# Patient Record
Sex: MALE | Race: WHITE | Employment: FULL TIME | URBAN - METROPOLITAN AREA
[De-identification: names, ages, dates, MRNs, and addresses within clinical notes are randomized per-mention and may not be internally consistent; named-entity substitution may affect disease eponyms.]

---

## 2018-12-07 ENCOUNTER — OFFICE VISIT (OUTPATIENT)
Dept: CARDIOLOGY CLINIC | Age: 53
End: 2018-12-07
Payer: COMMERCIAL

## 2018-12-07 VITALS
RESPIRATION RATE: 16 BRPM | BODY MASS INDEX: 35.21 KG/M2 | HEART RATE: 60 BPM | HEIGHT: 72 IN | WEIGHT: 260 LBS | DIASTOLIC BLOOD PRESSURE: 82 MMHG | SYSTOLIC BLOOD PRESSURE: 120 MMHG

## 2018-12-07 DIAGNOSIS — I25.10 ATHEROSCLEROSIS OF NATIVE CORONARY ARTERY OF NATIVE HEART WITHOUT ANGINA PECTORIS: Primary | ICD-10-CM

## 2018-12-07 PROCEDURE — G8417 CALC BMI ABV UP PARAM F/U: HCPCS | Performed by: INTERNAL MEDICINE

## 2018-12-07 PROCEDURE — G8427 DOCREV CUR MEDS BY ELIG CLIN: HCPCS | Performed by: INTERNAL MEDICINE

## 2018-12-07 PROCEDURE — 93000 ELECTROCARDIOGRAM COMPLETE: CPT | Performed by: INTERNAL MEDICINE

## 2018-12-07 PROCEDURE — 3017F COLORECTAL CA SCREEN DOC REV: CPT | Performed by: INTERNAL MEDICINE

## 2018-12-07 PROCEDURE — G8484 FLU IMMUNIZE NO ADMIN: HCPCS | Performed by: INTERNAL MEDICINE

## 2018-12-07 PROCEDURE — 1036F TOBACCO NON-USER: CPT | Performed by: INTERNAL MEDICINE

## 2018-12-07 PROCEDURE — 99214 OFFICE O/P EST MOD 30 MIN: CPT | Performed by: INTERNAL MEDICINE

## 2018-12-07 PROCEDURE — G8598 ASA/ANTIPLAT THER USED: HCPCS | Performed by: INTERNAL MEDICINE

## 2018-12-07 NOTE — PROGRESS NOTES
** Merged History Encounter **            Family History   Problem Relation Age of Onset    Heart Attack Father     Heart Disease Father     Heart Disease Brother        SUBJECTIVE: Hal Zuluaga presents to the office today for re-evaluation of 3 chronic cardiac diagnoses. He complains of no concerning symptoms and denies dyspnea, exertional chest pressure/discomfort, orthopnea, palpitations, paroxysmal nocturnal dyspnea and syncope. He works full time as a , and does all chores around the home without restriction. Certainly no syncope. Review of Systems  Other than stated in HPI, negative 10-point system review. Objective:   Physical Exam   Constitutional: He is oriented to person, place, and time. He appears well-developed. He is cooperative. HENT: oral mucosa well hydrated, normal teeth and gums  Head: Normocephalic and atraumatic. Eyes: Conjunctivae are normal. Pupils are equal, round  Neck: No JVD present. Carotid bruit is not present. Cardiovascular: Regular rhythm, S1 normal, S2 normal and intact distal pulses. PMI is not displaced. Exam reveals no gallop. No murmur heard. Pulmonary/Chest: Effort normal and breath sounds normal. No respiratory distress. Sternotomy scar     Abdominal: Soft. Normal appearance and bowel sounds are normal. He exhibits no abdominal bruit and no pulsatile midline mass. There is no hepatomegaly. No tenderness. Musculoskeletal: Normal range of motion. He exhibits no edema. Neurological: He is alert and oriented to person, place, and time. Gait normal.   Skin: Skin is warm and dry. No bruising, no ecchymosis and no rash noted. He is not diaphoretic. No cyanosis. Psychiatric: He has a normal mood and affect. His behavior is normal. Thought content normal.     EKG:  sinus bradycardia,  nonspecific ST and T waves changes, anterolateral and inferior MI patterns, no change  .         ASSESSMENT & PLAN:    Patient Active Problem List

## 2019-06-07 DIAGNOSIS — I71.21 ASCENDING AORTIC ANEURYSM: Primary | ICD-10-CM

## 2019-06-12 DIAGNOSIS — I71.21 ASCENDING AORTIC ANEURYSM: Primary | ICD-10-CM

## 2019-08-07 ENCOUNTER — HOSPITAL ENCOUNTER (OUTPATIENT)
Dept: CT IMAGING | Age: 54
Discharge: HOME | End: 2019-08-09
Payer: COMMERCIAL

## 2019-08-07 DIAGNOSIS — I71.21 ASCENDING AORTIC ANEURYSM: ICD-10-CM

## 2019-08-07 PROCEDURE — 71250 CT THORAX DX C-: CPT

## 2019-08-27 ENCOUNTER — OFFICE VISIT (OUTPATIENT)
Dept: CARDIOTHORACIC SURGERY | Age: 54
End: 2019-08-27
Payer: COMMERCIAL

## 2019-08-27 VITALS
HEIGHT: 72 IN | DIASTOLIC BLOOD PRESSURE: 78 MMHG | HEART RATE: 59 BPM | BODY MASS INDEX: 32.51 KG/M2 | WEIGHT: 240 LBS | SYSTOLIC BLOOD PRESSURE: 123 MMHG

## 2019-08-27 DIAGNOSIS — I71.21 THORACIC ASCENDING AORTIC ANEURYSM: Primary | ICD-10-CM

## 2019-08-27 PROCEDURE — 3017F COLORECTAL CA SCREEN DOC REV: CPT | Performed by: THORACIC SURGERY (CARDIOTHORACIC VASCULAR SURGERY)

## 2019-08-27 PROCEDURE — 99214 OFFICE O/P EST MOD 30 MIN: CPT | Performed by: THORACIC SURGERY (CARDIOTHORACIC VASCULAR SURGERY)

## 2019-08-27 PROCEDURE — 1036F TOBACCO NON-USER: CPT | Performed by: THORACIC SURGERY (CARDIOTHORACIC VASCULAR SURGERY)

## 2019-08-27 PROCEDURE — G8417 CALC BMI ABV UP PARAM F/U: HCPCS | Performed by: THORACIC SURGERY (CARDIOTHORACIC VASCULAR SURGERY)

## 2019-08-27 PROCEDURE — G8427 DOCREV CUR MEDS BY ELIG CLIN: HCPCS | Performed by: THORACIC SURGERY (CARDIOTHORACIC VASCULAR SURGERY)

## 2019-08-27 PROCEDURE — G8598 ASA/ANTIPLAT THER USED: HCPCS | Performed by: THORACIC SURGERY (CARDIOTHORACIC VASCULAR SURGERY)

## 2019-12-19 ENCOUNTER — TELEPHONE (OUTPATIENT)
Dept: CARDIOLOGY CLINIC | Age: 54
End: 2019-12-19

## 2020-01-07 ENCOUNTER — OFFICE VISIT (OUTPATIENT)
Dept: CARDIOLOGY CLINIC | Age: 55
End: 2020-01-07
Payer: COMMERCIAL

## 2020-01-07 VITALS
RESPIRATION RATE: 16 BRPM | BODY MASS INDEX: 34.99 KG/M2 | HEIGHT: 73 IN | HEART RATE: 55 BPM | WEIGHT: 264 LBS | DIASTOLIC BLOOD PRESSURE: 84 MMHG | SYSTOLIC BLOOD PRESSURE: 126 MMHG

## 2020-01-07 PROCEDURE — 93000 ELECTROCARDIOGRAM COMPLETE: CPT | Performed by: INTERNAL MEDICINE

## 2020-01-07 PROCEDURE — G8427 DOCREV CUR MEDS BY ELIG CLIN: HCPCS | Performed by: INTERNAL MEDICINE

## 2020-01-07 PROCEDURE — 3017F COLORECTAL CA SCREEN DOC REV: CPT | Performed by: INTERNAL MEDICINE

## 2020-01-07 PROCEDURE — 1036F TOBACCO NON-USER: CPT | Performed by: INTERNAL MEDICINE

## 2020-01-07 PROCEDURE — 99214 OFFICE O/P EST MOD 30 MIN: CPT | Performed by: INTERNAL MEDICINE

## 2020-01-07 PROCEDURE — G8484 FLU IMMUNIZE NO ADMIN: HCPCS | Performed by: INTERNAL MEDICINE

## 2020-01-07 PROCEDURE — G8417 CALC BMI ABV UP PARAM F/U: HCPCS | Performed by: INTERNAL MEDICINE

## 2020-01-07 NOTE — PROGRESS NOTES
Medical: Not on file     Non-medical: Not on file   Tobacco Use    Smoking status: Never Smoker    Smokeless tobacco: Never Used   Substance and Sexual Activity    Alcohol use: Yes     Comment: social    Drug use: No    Sexual activity: Not on file   Lifestyle    Physical activity:     Days per week: Not on file     Minutes per session: Not on file    Stress: Not on file   Relationships    Social connections:     Talks on phone: Not on file     Gets together: Not on file     Attends Shinto service: Not on file     Active member of club or organization: Not on file     Attends meetings of clubs or organizations: Not on file     Relationship status: Not on file    Intimate partner violence:     Fear of current or ex partner: Not on file     Emotionally abused: Not on file     Physically abused: Not on file     Forced sexual activity: Not on file   Other Topics Concern    Not on file   Social History Narrative    ** Merged History Encounter **            Family History   Problem Relation Age of Onset    Heart Attack Father     Heart Disease Father     Heart Disease Brother        SUBJECTIVE: Regina Yadav presents to the office today for re-evaluation of 3 chronic cardiac diagnoses. He complains of no concerning symptoms and denies dyspnea, exertional chest pressure/discomfort, orthopnea, palpitations, paroxysmal nocturnal dyspnea and syncope. He works full time as a , and does all chores around the home without restriction. Certainly no syncope. Released by CTS for stable thoracic aneurysm over 10 years. Review of Systems  Other than stated in HPI, negative 10-point system review. Objective:   Physical Exam   Constitutional: He is oriented to person, place, and time. He appears well-developed. He is cooperative. HENT: oral mucosa well hydrated, normal teeth and gums  Head: Normocephalic and atraumatic.    Eyes: Conjunctivae are normal. Pupils are equal, round  Neck: No JVD present. Carotid bruit is not present. Cardiovascular: Regular rhythm, S1 normal, S2 normal and intact distal pulses. PMI is not displaced. Exam reveals no gallop. No murmur heard. Pulmonary/Chest: Effort normal and breath sounds normal. No respiratory distress. Sternotomy scar     Abdominal: Soft. Normal appearance and bowel sounds are normal. He exhibits no abdominal bruit and no pulsatile midline mass. There is no hepatomegaly. No tenderness. Musculoskeletal: Normal range of motion. He exhibits no edema. Neurological: He is alert and oriented to person, place, and time. Gait normal.   Skin: Skin is warm and dry. No bruising, no ecchymosis and no rash noted. He is not diaphoretic. No cyanosis. Psychiatric: He has a normal mood and affect. His behavior is normal. Thought content normal.     EKG:  sinus bradycardia,  nonspecific ST and T waves changes, anterolateral and inferior MI patterns, no change  . ASSESSMENT & PLAN:    Patient Active Problem List   Diagnoses    Coronary atherosclerosis of native coronary artery: s/p emergency ACB after presenting 2009 with anterior MI. No symptoms of angina    LV dysfunction: EF improved post revascularization and vasoactive meds.  Hyperlipidemia: target LDL < 70: On statin     Hypertension: at target   *  * Aortic insufficiency: mild by echo 2011  Obesity: extensive discussion about weight loss desirability and strategies         The patient was educated as to the symptoms that would require a prompt return to our office. Short of that, he will be seen at his regularly scheduled visit in 1 year.

## 2021-03-12 ENCOUNTER — IMMUNIZATION (OUTPATIENT)
Dept: PRIMARY CARE CLINIC | Age: 56
End: 2021-03-12
Payer: COMMERCIAL

## 2021-03-12 PROCEDURE — 91300 COVID-19, PFIZER VACCINE 30MCG/0.3ML DOSE: CPT | Performed by: NURSE PRACTITIONER

## 2021-03-12 PROCEDURE — 0001A COVID-19, PFIZER VACCINE 30MCG/0.3ML DOSE: CPT | Performed by: NURSE PRACTITIONER

## 2021-04-13 ENCOUNTER — IMMUNIZATION (OUTPATIENT)
Dept: PRIMARY CARE CLINIC | Age: 56
End: 2021-04-13
Payer: COMMERCIAL

## 2021-04-13 PROCEDURE — 91300 COVID-19, PFIZER VACCINE 30MCG/0.3ML DOSE: CPT | Performed by: INTERNAL MEDICINE

## 2021-04-13 PROCEDURE — 0002A COVID-19, PFIZER VACCINE 30MCG/0.3ML DOSE: CPT | Performed by: INTERNAL MEDICINE

## 2023-01-04 ENCOUNTER — OFFICE VISIT (OUTPATIENT)
Dept: CARDIOLOGY CLINIC | Age: 58
End: 2023-01-04
Payer: COMMERCIAL

## 2023-01-04 VITALS
WEIGHT: 259 LBS | HEART RATE: 59 BPM | HEIGHT: 72 IN | BODY MASS INDEX: 35.08 KG/M2 | DIASTOLIC BLOOD PRESSURE: 72 MMHG | SYSTOLIC BLOOD PRESSURE: 125 MMHG | RESPIRATION RATE: 16 BRPM

## 2023-01-04 DIAGNOSIS — I50.20 HFREF (HEART FAILURE WITH REDUCED EJECTION FRACTION) (HCC): ICD-10-CM

## 2023-01-04 DIAGNOSIS — I77.810 THORACIC AORTIC ECTASIA (HCC): ICD-10-CM

## 2023-01-04 DIAGNOSIS — I25.10 ATHEROSCLEROSIS OF NATIVE CORONARY ARTERY OF NATIVE HEART WITHOUT ANGINA PECTORIS: Primary | ICD-10-CM

## 2023-01-04 PROCEDURE — G8484 FLU IMMUNIZE NO ADMIN: HCPCS | Performed by: INTERNAL MEDICINE

## 2023-01-04 PROCEDURE — G8417 CALC BMI ABV UP PARAM F/U: HCPCS | Performed by: INTERNAL MEDICINE

## 2023-01-04 PROCEDURE — 99214 OFFICE O/P EST MOD 30 MIN: CPT | Performed by: INTERNAL MEDICINE

## 2023-01-04 PROCEDURE — G8427 DOCREV CUR MEDS BY ELIG CLIN: HCPCS | Performed by: INTERNAL MEDICINE

## 2023-01-04 PROCEDURE — 3078F DIAST BP <80 MM HG: CPT | Performed by: INTERNAL MEDICINE

## 2023-01-04 PROCEDURE — 93000 ELECTROCARDIOGRAM COMPLETE: CPT | Performed by: INTERNAL MEDICINE

## 2023-01-04 PROCEDURE — 3017F COLORECTAL CA SCREEN DOC REV: CPT | Performed by: INTERNAL MEDICINE

## 2023-01-04 PROCEDURE — 3074F SYST BP LT 130 MM HG: CPT | Performed by: INTERNAL MEDICINE

## 2023-01-04 PROCEDURE — 1036F TOBACCO NON-USER: CPT | Performed by: INTERNAL MEDICINE

## 2023-01-04 NOTE — PROGRESS NOTES
Subjective:      Patient ID: Jesús Gordon is a 59 y.o. male. Chief Complaint   Patient presents with    Coronary Artery Disease    Cardiomyopathy       Patient Active Problem List    Diagnosis Date Noted    Thoracic aortic ectasia ([facility] 01/04/2023     Overview Note:     CT scan report disputed by dr Elsy Cooper who felt max diameter was 4.0 and recommended no need for ongoing annual scans , as stable dimension noted for 10 yrs                          Hypertension 11/14/2011    Coronary atherosclerosis of native coronary artery 11/09/2011     Overview Note: A. S/P anterior myocardial infarction 4/15/09 treated with PTCA of culprit lesion    and immediate aortic coronary bypass grafting by Dr. Litzy Disla. LIMA - LAD            SVG - DIAG 1            SVG - DIAG 2            SVG - OM 1            SVG - PDA      HFrEF (heart failure with reduced ejection fraction) ([facility] 11/09/2011     Overview Note:       A. MUGA scan 10/27/09. LVEF 40%. B. Echo 2/09/2011 [facility]): LVEF: 40-45%  C. Echo 12/28/2017:  EF 40-45%        Hyperlipidemia 11/09/2011       Current Outpatient Medications   Medication Sig Dispense Refill    atorvastatin (LIPITOR) 80 MG tablet Take 80 mg by mouth daily      enalapril (VASOTEC) 10 MG tablet Take 10 mg by mouth daily      omeprazole (PRILOSEC) 20 MG capsule Take 20 mg by mouth Daily       metoprolol (LOPRESSOR) 25 MG tablet Take 12.5 mg by mouth 2 times daily       aspirin 81 MG EC tablet Take 81 mg by mouth daily.        Current Facility-Administered Medications   Medication Dose Route Frequency Provider Last Rate Last Admin    perflutren lipid microspheres (DEFINITY) injection 1.5 mL  1.5 mL IntraVENous ONCE PRN Anselmo Kumar MD            Allergies   Allergen Reactions    Crestor  [Rosuvastatin Calcium]        Vitals:    01/04/23 0813   BP: 125/72   Pulse: 59   Resp: 16   Weight: 259 lb (117.5 kg)   Height: 6' (1.829 m)             SUBJECTIVE: Jesús Gordon presents to the office today for re-evaluation of  chronic cardiac diagnoses. He complains of  no concerning symptoms  and denies dyspnea, exertional chest pressure/discomfort, orthopnea, palpitations, paroxysmal nocturnal dyspnea and syncope. He works full time as a , and does all chores around the home without restriction. Released by CTS for stable thoracic aneurysm over 10 years. Objective:   Physical Exam   Constitutional: He is oriented to person, place, and time. He appears well-developed. He is cooperative. Neck: No JVD present. Carotid bruit is not present. Cardiovascular: Regular rhythm, S1 normal, S2 normal and intact distal pulses. PMI is not displaced. Exam reveals no gallop. No murmur heard. Pulmonary/Chest: Effort normal and breath sounds normal. No respiratory distress. Sternotomy scar  Abdominal: Soft. Normal appearance and bowel sounds are normal. He exhibits no abdominal bruit  Musculoskeletal: Normal range of motion. He exhibits no edema. Neurological: He is alert and oriented to person, place, and time. Gait normal.   Skin: Skin is warm and dry. No bruising  Psychiatric: He has a normal mood and affect. His behavior is normal. Thought content normal.     EKG:  sinus bradycardia,  nonspecific ST and T waves changes, anterolateral and inferior MI patterns, no change  . ASSESSMENT & PLAN:    Patient Active Problem List   Diagnoses    Coronary atherosclerosis of native coronary artery: s/p emergency ACB after presenting 2009 with anterior MI. No symptoms of angina    LV dysfunction:     Hyperlipidemia: target LDL < 70:   On high intensity statin     Hypertension: at target   *  * Aortic insufficiency: mild by echo 2011  Obesity:      Will echo as it has been 5 yrs since last assessment of EF - if fallen will change ACE to ARNI and metoprolol preparation to succinate, as well as consider MRA/SGLT2i therapy

## 2023-01-23 ENCOUNTER — TELEPHONE (OUTPATIENT)
Dept: CARDIOLOGY | Age: 58
End: 2023-01-23

## 2023-01-24 ENCOUNTER — TELEPHONE (OUTPATIENT)
Dept: CARDIOLOGY CLINIC | Age: 58
End: 2023-01-24

## 2023-01-24 ENCOUNTER — HOSPITAL ENCOUNTER (OUTPATIENT)
Dept: CARDIOLOGY | Age: 58
Discharge: HOME | End: 2023-01-24
Payer: COMMERCIAL

## 2023-01-24 DIAGNOSIS — I50.20 HFREF (HEART FAILURE WITH REDUCED EJECTION FRACTION) (HCC): ICD-10-CM

## 2023-01-24 LAB
LV EF: 50 %
LVEF MODALITY: NORMAL

## 2023-01-24 PROCEDURE — 93306 TTE W/DOPPLER COMPLETE: CPT

## 2023-01-24 PROCEDURE — 6360000004 HC RX CONTRAST MEDICATION: Performed by: INTERNAL MEDICINE

## 2023-01-24 PROCEDURE — 2580000003 HC RX 258: Performed by: INTERNAL MEDICINE

## 2023-01-24 RX ORDER — SODIUM CHLORIDE 0.9 % (FLUSH) 0.9 %
10 SYRINGE (ML) INJECTION PRN
Status: DISCONTINUED | OUTPATIENT
Start: 2023-01-24 | End: 2023-01-25 | Stop reason: HOSPADM

## 2023-01-24 RX ADMIN — SODIUM CHLORIDE, PRESERVATIVE FREE 10 ML: 5 INJECTION INTRAVENOUS at 08:31

## 2023-01-24 RX ADMIN — PERFLUTREN 1 ML: 6.52 INJECTION, SUSPENSION INTRAVENOUS at 08:31

## 2023-01-24 NOTE — TELEPHONE ENCOUNTER
----- Message from El Barnes MD sent at 1/24/2023  9:52 AM EST -----  Heart still as strong as before  Stay on same meds  Ov one year      ----- Message -----  From: Isidro Appiah Incoming Cardiology Results From [facility]  Sent: 1/24/2023   9:51 AM EST  To: El Barnes MD

## 2023-08-03 ENCOUNTER — OFFICE VISIT (OUTPATIENT)
Dept: CARDIOLOGY CLINIC | Age: 58
End: 2023-08-03
Payer: COMMERCIAL

## 2023-08-03 VITALS
DIASTOLIC BLOOD PRESSURE: 84 MMHG | RESPIRATION RATE: 18 BRPM | HEIGHT: 73 IN | BODY MASS INDEX: 33 KG/M2 | HEART RATE: 71 BPM | WEIGHT: 249 LBS | SYSTOLIC BLOOD PRESSURE: 126 MMHG

## 2023-08-03 DIAGNOSIS — I50.20 HFREF (HEART FAILURE WITH REDUCED EJECTION FRACTION) (HCC): ICD-10-CM

## 2023-08-03 DIAGNOSIS — I25.10 ATHEROSCLEROSIS OF NATIVE CORONARY ARTERY OF NATIVE HEART WITHOUT ANGINA PECTORIS: Primary | ICD-10-CM

## 2023-08-03 PROCEDURE — G8417 CALC BMI ABV UP PARAM F/U: HCPCS | Performed by: INTERNAL MEDICINE

## 2023-08-03 PROCEDURE — G8427 DOCREV CUR MEDS BY ELIG CLIN: HCPCS | Performed by: INTERNAL MEDICINE

## 2023-08-03 PROCEDURE — 3017F COLORECTAL CA SCREEN DOC REV: CPT | Performed by: INTERNAL MEDICINE

## 2023-08-03 PROCEDURE — 93000 ELECTROCARDIOGRAM COMPLETE: CPT | Performed by: INTERNAL MEDICINE

## 2023-08-03 PROCEDURE — 1036F TOBACCO NON-USER: CPT | Performed by: INTERNAL MEDICINE

## 2023-08-03 PROCEDURE — 99215 OFFICE O/P EST HI 40 MIN: CPT | Performed by: INTERNAL MEDICINE

## 2023-08-03 PROCEDURE — 3074F SYST BP LT 130 MM HG: CPT | Performed by: INTERNAL MEDICINE

## 2023-08-03 PROCEDURE — 3079F DIAST BP 80-89 MM HG: CPT | Performed by: INTERNAL MEDICINE

## 2023-08-03 RX ORDER — EZETIMIBE 10 MG/1
10 TABLET ORAL DAILY
Qty: 30 TABLET | Refills: 3 | Status: SHIPPED | OUTPATIENT
Start: 2023-08-03

## 2023-08-03 NOTE — PROGRESS NOTES
Subjective:      Patient ID: Mattie Smiley is a 59 y.o. male. Chief Complaint   Patient presents with    Coronary Artery Disease    Cardiomyopathy       Patient Active Problem List    Diagnosis Date Noted    Thoracic aortic ectasia ([address]) 01/04/2023     Overview Note:     CT scan report disputed by dr Srinivas Petersen who felt max diameter was 4.0 and recommended no need for ongoing annual scans , as stable dimension noted for 10 yrs                          Hypertension 11/14/2011    Coronary atherosclerosis of native coronary artery 11/09/2011     Overview Note: A. S/P anterior myocardial infarction 4/15/09 treated with PTCA of culprit lesion    and immediate aortic coronary bypass grafting by Dr. Kem Richards. RYAN - LAD            SVG - DIAG 1            SVG - DIAG 2            SVG - OM 1            SVG - PDA      HFrEF (heart failure with reduced ejection fraction) ([address]) 11/09/2011     Overview Note:       A. MUGA scan 10/27/09. LVEF 40%. B. Echo 2/09/2011 [facility]): LVEF: 40-45%  C. Echo 12/28/2017:  EF 40-45%  D. Echo 1/24/2023: EF 50%        Hyperlipidemia 11/09/2011       Current Outpatient Medications   Medication Sig Dispense Refill    atorvastatin (LIPITOR) 80 MG tablet Take 1 tablet by mouth daily      enalapril (VASOTEC) 10 MG tablet Take 1 tablet by mouth daily      omeprazole (PRILOSEC) 20 MG capsule Take 1 capsule by mouth Daily      metoprolol (LOPRESSOR) 25 MG tablet Take 0.5 tablets by mouth 2 times daily Indications: 1/2 pill bid      aspirin 81 MG EC tablet Take 1 tablet by mouth daily       No current facility-administered medications for this visit. Allergies   Allergen Reactions    Crestor  [Rosuvastatin Calcium]        Vitals:    08/03/23 0755   BP: 126/84   Pulse: 71   Resp: 18   Weight: 249 lb (112.9 kg)   Height: 6' 1\" (1.854 m)             SUBJECTIVE: Mattie Smiley presents to the office today for urgent re-evaluation of  chronic cardiac diagnoses.   About 6 weeks

## 2023-08-10 ENCOUNTER — TELEPHONE (OUTPATIENT)
Dept: CARDIOLOGY | Age: 58
End: 2023-08-10

## 2023-08-10 NOTE — TELEPHONE ENCOUNTER
Spoke with patient and confirmed nuclear exercise stress test appointment on August 14, 2023 at 0700. Instructions for test, and COVID-19 preprocedure information reviewed with patient, questions answered. Patient verbalized understanding. Also instructed to call office if unable to keep appointment.

## 2023-08-14 ENCOUNTER — HOSPITAL ENCOUNTER (OUTPATIENT)
Dept: CARDIOLOGY | Age: 58
Discharge: HOME | End: 2023-08-14
Payer: COMMERCIAL

## 2023-08-14 ENCOUNTER — TELEPHONE (OUTPATIENT)
Dept: CARDIOLOGY CLINIC | Age: 58
End: 2023-08-14

## 2023-08-14 VITALS
HEIGHT: 73 IN | WEIGHT: 247 LBS | RESPIRATION RATE: 12 BRPM | DIASTOLIC BLOOD PRESSURE: 74 MMHG | HEART RATE: 64 BPM | BODY MASS INDEX: 32.74 KG/M2 | SYSTOLIC BLOOD PRESSURE: 120 MMHG

## 2023-08-14 DIAGNOSIS — Z01.810 PREOPERATIVE CARDIOVASCULAR EXAMINATION: Primary | ICD-10-CM

## 2023-08-14 DIAGNOSIS — I50.20 HFREF (HEART FAILURE WITH REDUCED EJECTION FRACTION) (HCC): ICD-10-CM

## 2023-08-14 DIAGNOSIS — I25.10 ATHEROSCLEROSIS OF NATIVE CORONARY ARTERY OF NATIVE HEART WITHOUT ANGINA PECTORIS: ICD-10-CM

## 2023-08-14 DIAGNOSIS — R94.39 ABNORMAL STRESS TEST: ICD-10-CM

## 2023-08-14 LAB
LV EF: 36 %
LVEF MODALITY: NORMAL

## 2023-08-14 PROCEDURE — A9500 TC99M SESTAMIBI: HCPCS | Performed by: INTERNAL MEDICINE

## 2023-08-14 PROCEDURE — 3430000000 HC RX DIAGNOSTIC RADIOPHARMACEUTICAL: Performed by: INTERNAL MEDICINE

## 2023-08-14 PROCEDURE — 2580000003 HC RX 258: Performed by: INTERNAL MEDICINE

## 2023-08-14 PROCEDURE — 78452 HT MUSCLE IMAGE SPECT MULT: CPT

## 2023-08-14 PROCEDURE — 93017 CV STRESS TEST TRACING ONLY: CPT

## 2023-08-14 RX ORDER — IBUPROFEN 800 MG/1
TABLET ORAL PRN
COMMUNITY
Start: 2023-07-11

## 2023-08-14 RX ORDER — SODIUM CHLORIDE 0.9 % (FLUSH) 0.9 %
10 SYRINGE (ML) INJECTION PRN
Status: DISCONTINUED | OUTPATIENT
Start: 2023-08-14 | End: 2023-08-15 | Stop reason: HOSPADM

## 2023-08-14 RX ORDER — TETRAKIS(2-METHOXYISOBUTYLISOCYANIDE)COPPER(I) TETRAFLUOROBORATE 1 MG/ML
31.9 INJECTION, POWDER, LYOPHILIZED, FOR SOLUTION INTRAVENOUS
Status: COMPLETED | OUTPATIENT
Start: 2023-08-14 | End: 2023-08-14

## 2023-08-14 RX ORDER — TETRAKIS(2-METHOXYISOBUTYLISOCYANIDE)COPPER(I) TETRAFLUOROBORATE 1 MG/ML
10.4 INJECTION, POWDER, LYOPHILIZED, FOR SOLUTION INTRAVENOUS
Status: COMPLETED | OUTPATIENT
Start: 2023-08-14 | End: 2023-08-14

## 2023-08-14 RX ORDER — HYDROCODONE BITARTRATE AND ACETAMINOPHEN 7.5; 325 MG/1; MG/1
TABLET ORAL PRN
COMMUNITY
Start: 2023-07-11

## 2023-08-14 RX ADMIN — SODIUM CHLORIDE, PRESERVATIVE FREE 10 ML: 5 INJECTION INTRAVENOUS at 08:31

## 2023-08-14 RX ADMIN — Medication 10.4 MILLICURIE: at 07:09

## 2023-08-14 RX ADMIN — SODIUM CHLORIDE, PRESERVATIVE FREE 10 ML: 5 INJECTION INTRAVENOUS at 07:10

## 2023-08-14 RX ADMIN — Medication 31.9 MILLICURIE: at 08:31

## 2023-08-14 NOTE — TELEPHONE ENCOUNTER
And his wife notified and instructed on stress test results and recommendation for cath and medication changes. They stated understanding.

## 2023-08-14 NOTE — PROCEDURES
[address] [facility] Vascular [address]   [facility], [address]  [phone]                Exercise Stress Nuclear Gated SPECT Study    Name: Elizabeth Jung Account Number: [de-identified]    :  1965      Sex: male              Date of Study:  2023    Height: 6' 1\" (185.4 cm)  Weight - Scale: 247 lb (112 kg)     Ordering Provider: Janiya Akins MD          PCP: Joanie Griffin DO      Cardiologist: Janiya Akins MD                        Interpreting Physician: Jaylene Arellano MD  _________________________________________________________________________________    Indication:   Evaluation of extent and severity of coronary artery disease    Clinical History:   Patient has prior history of coronary artery disease. Resting ECG:    HR 48 bpm  Sinus bradycardia, old AWMI pattern    Exercise: The patient exercised using a Parvez protocol, completing 4:50 minutes and reaching an estimated work load of 7.86 metabolic equivalents (METS). Resting HR was 48. Peak exercise heart rate was 151 ( 93% of maximum predicted heart rate for age). Baseline /74. Peak exercise /80. The blood pressure response to exercise was normal      Exercise was terminated due to dyspnea. The patient experienced no chest pain with exercise, but developed mid-sternal chest tightness post-exercise lasting 3.5 minutes into recovery. Pulse oximetry was used to monitor oxygen saturation during the stress test.  The study was performed on Room Air. The resting pulse oximeter was 96%. The lowest O2 saturation seen during exercise was 98 %. The average O2 saturation with exercise was 98 %. Exercise ECG:   The patient demonstrated occasional PVC's during exercise. With exercise, there were no ST segment changes of significance at the heart rate achieved.     IMAGING: Myocardial perfusion imaging was performed at rest 30-35 minutes following

## 2023-08-14 NOTE — DISCHARGE INSTRUCTIONS
[address] [facility] and Vascular Lab      Instructions to Patients    The following are the instructions for patients who have had a procedure in our office today. Patient name: Latoya Gaona    Radionuclide Activity: 40mCi of 99mTc-Sestamibi    Date Administered: 8/14/2023    Expires: 48 hours after scheduled appointment time      Patient may resume normal activity unless otherwise instructed. Patient may resume medications as normal.  If the need should arise, patient may call [phone] between the hours of 8:00am-5:00pm.  After hours there is at least one physician on-call at all times for those patients needing assistance. Patients may call [phone] and the answering service will direct the patient to one of our physicians for assistance. After the patient's test if they are going to be leaving from an airport in the near future they should take this letter with them to verify the test and radionuclide used for their test.      This letter verifies that the above named bearer received an injection of a radionuclide for medical purpose/usage only.         Electronically signed by Adonay Allen on 8/14/2023 at 8:21 AM

## 2023-08-14 NOTE — TELEPHONE ENCOUNTER
----- Message from Kiana Paulino MD sent at 8/14/2023  1:55 PM EDT -----  Let him know stress abnormal and needs a cath  - numbers 9/3  Stop enlapril (vasotec)  Wait 48 hours and start entresto 24/26 bid  Make sure he is on ASA    Thx      ----- Message -----  From: Oriana Calderon MD  Sent: 8/14/2023   1:43 PM EDT  To: Kiana Paulino MD

## 2023-08-16 ENCOUNTER — TELEPHONE (OUTPATIENT)
Dept: CARDIOLOGY CLINIC | Age: 58
End: 2023-08-16

## 2023-08-16 NOTE — TELEPHONE ENCOUNTER
Prior auth    Heart cath [id number]    Dx: abnormal stress test R94.39         Chest pain R07.2    RH ordered     8/23/23 @ 1:30

## 2023-08-21 ENCOUNTER — HOSPITAL ENCOUNTER (OUTPATIENT)
Age: 58
Discharge: HOME | End: 2023-08-21
Payer: COMMERCIAL

## 2023-08-21 DIAGNOSIS — R94.39 ABNORMAL STRESS TEST: ICD-10-CM

## 2023-08-21 DIAGNOSIS — I25.10 ATHEROSCLEROSIS OF NATIVE CORONARY ARTERY OF NATIVE HEART WITHOUT ANGINA PECTORIS: ICD-10-CM

## 2023-08-21 DIAGNOSIS — I50.20 HFREF (HEART FAILURE WITH REDUCED EJECTION FRACTION) (HCC): ICD-10-CM

## 2023-08-21 DIAGNOSIS — Z01.810 PREOPERATIVE CARDIOVASCULAR EXAMINATION: ICD-10-CM

## 2023-08-21 LAB
ALBUMIN SERPL-MCNC: 4.1 G/DL (ref 3.5–5.2)
ALP SERPL-CCNC: 82 U/L (ref 40–129)
ALT SERPL-CCNC: 23 U/L (ref 0–40)
ANION GAP SERPL CALCULATED.3IONS-SCNC: 7 MMOL/L (ref 7–16)
AST SERPL-CCNC: 31 U/L (ref 0–39)
BILIRUB SERPL-MCNC: 0.9 MG/DL (ref 0–1.2)
BUN SERPL-MCNC: 11 MG/DL (ref 6–20)
CALCIUM SERPL-MCNC: 9.2 MG/DL (ref 8.6–10.2)
CHLORIDE SERPL-SCNC: 106 MMOL/L (ref 98–107)
CO2 SERPL-SCNC: 27 MMOL/L (ref 22–29)
CREAT SERPL-MCNC: 1 MG/DL (ref 0.7–1.2)
ERYTHROCYTE [DISTWIDTH] IN BLOOD BY AUTOMATED COUNT: 12.9 % (ref 11.5–15)
GFR SERPL CREATININE-BSD FRML MDRD: >60 ML/MIN/1.73M2
GLUCOSE SERPL-MCNC: 120 MG/DL (ref 74–99)
HCT VFR BLD AUTO: 44.3 % (ref 37–54)
HGB BLD-MCNC: 14.7 G/DL (ref 12.5–16.5)
INR PPP: 1
MCH RBC QN AUTO: 30.2 PG (ref 26–35)
MCHC RBC AUTO-ENTMCNC: 33.2 G/DL (ref 32–34.5)
MCV RBC AUTO: 91 FL (ref 80–99.9)
PLATELET # BLD AUTO: 255 K/UL (ref 130–450)
PMV BLD AUTO: 9.4 FL (ref 7–12)
POTASSIUM SERPL-SCNC: 4.7 MMOL/L (ref 3.5–5)
PROT SERPL-MCNC: 6.9 G/DL (ref 6.4–8.3)
PROTHROMBIN TIME: 11.3 SEC (ref 9.3–12.4)
RBC # BLD AUTO: 4.87 M/UL (ref 3.8–5.8)
SODIUM SERPL-SCNC: 140 MMOL/L (ref 132–146)
WBC OTHER # BLD: 6.5 K/UL (ref 4.5–11.5)

## 2023-08-21 PROCEDURE — 85610 PROTHROMBIN TIME: CPT

## 2023-08-21 PROCEDURE — 36415 COLL VENOUS BLD VENIPUNCTURE: CPT

## 2023-08-21 PROCEDURE — 85027 COMPLETE CBC AUTOMATED: CPT

## 2023-08-21 PROCEDURE — 80053 COMPREHEN METABOLIC PANEL: CPT

## 2023-08-22 ENCOUNTER — TELEPHONE (OUTPATIENT)
Dept: CARDIAC CATH/INVASIVE PROCEDURES | Age: 58
End: 2023-08-22

## 2023-08-23 ENCOUNTER — HOSPITAL ENCOUNTER (INPATIENT)
Dept: CARDIAC CATH/INVASIVE PROCEDURES | Age: 58
LOS: 5 days | Discharge: HOME | DRG: 247 | End: 2023-08-28
Attending: INTERNAL MEDICINE | Admitting: INTERNAL MEDICINE
Payer: COMMERCIAL

## 2023-08-23 PROBLEM — I25.10 CAD IN NATIVE ARTERY: Status: ACTIVE | Noted: 2023-08-23

## 2023-08-23 LAB
ABO + RH BLD: NORMAL
ACTIVATED CLOTTING TIME, LOW RANGE: 164 SEC
ACTIVATED CLOTTING TIME, LOW RANGE: 271 SEC
ARM BAND NUMBER: NORMAL
BLOOD BANK SAMPLE EXPIRATION: NORMAL
BLOOD GROUP ANTIBODIES SERPL: NEGATIVE

## 2023-08-23 PROCEDURE — 93459 L HRT ART/GRFT ANGIO: CPT

## 2023-08-23 PROCEDURE — 6370000000 HC RX 637 (ALT 250 FOR IP): Performed by: INTERNAL MEDICINE

## 2023-08-23 PROCEDURE — C1887 CATHETER, GUIDING: HCPCS

## 2023-08-23 PROCEDURE — B2111ZZ FLUOROSCOPY OF MULTIPLE CORONARY ARTERIES USING LOW OSMOLAR CONTRAST: ICD-10-PCS | Performed by: INTERNAL MEDICINE

## 2023-08-23 PROCEDURE — C1725 CATH, TRANSLUMIN NON-LASER: HCPCS

## 2023-08-23 PROCEDURE — 2580000003 HC RX 258: Performed by: INTERNAL MEDICINE

## 2023-08-23 PROCEDURE — 92928 PRQ TCAT PLMT NTRAC ST 1 LES: CPT | Performed by: INTERNAL MEDICINE

## 2023-08-23 PROCEDURE — 86900 BLOOD TYPING SEROLOGIC ABO: CPT

## 2023-08-23 PROCEDURE — 2500000003 HC RX 250 WO HCPCS

## 2023-08-23 PROCEDURE — 92920 PRQ TRLUML C ANGIOP 1ART&/BR: CPT

## 2023-08-23 PROCEDURE — 86901 BLOOD TYPING SEROLOGIC RH(D): CPT

## 2023-08-23 PROCEDURE — B2131ZZ FLUOROSCOPY OF MULTIPLE CORONARY ARTERY BYPASS GRAFTS USING LOW OSMOLAR CONTRAST: ICD-10-PCS | Performed by: INTERNAL MEDICINE

## 2023-08-23 PROCEDURE — 4A023N7 MEASUREMENT OF CARDIAC SAMPLING AND PRESSURE, LEFT HEART, PERCUTANEOUS APPROACH: ICD-10-PCS | Performed by: INTERNAL MEDICINE

## 2023-08-23 PROCEDURE — 86850 RBC ANTIBODY SCREEN: CPT

## 2023-08-23 PROCEDURE — 6370000000 HC RX 637 (ALT 250 FOR IP)

## 2023-08-23 PROCEDURE — 2140000000 HC CCU INTERMEDIATE R&B

## 2023-08-23 PROCEDURE — 93459 L HRT ART/GRFT ANGIO: CPT | Performed by: INTERNAL MEDICINE

## 2023-08-23 PROCEDURE — C1894 INTRO/SHEATH, NON-LASER: HCPCS

## 2023-08-23 PROCEDURE — 2709999900 HC NON-CHARGEABLE SUPPLY

## 2023-08-23 PROCEDURE — 85347 COAGULATION TIME ACTIVATED: CPT

## 2023-08-23 PROCEDURE — C1769 GUIDE WIRE: HCPCS

## 2023-08-23 PROCEDURE — 6360000002 HC RX W HCPCS

## 2023-08-23 RX ORDER — ASPIRIN 325 MG
325 TABLET ORAL DAILY
Status: DISCONTINUED | OUTPATIENT
Start: 2023-08-23 | End: 2023-08-23 | Stop reason: ALTCHOICE

## 2023-08-23 RX ORDER — ASPIRIN 81 MG/1
81 TABLET ORAL DAILY
Status: DISCONTINUED | OUTPATIENT
Start: 2023-08-24 | End: 2023-08-28 | Stop reason: HOSPADM

## 2023-08-23 RX ORDER — METOPROLOL SUCCINATE 25 MG/1
25 TABLET, EXTENDED RELEASE ORAL DAILY
Status: DISCONTINUED | OUTPATIENT
Start: 2023-08-23 | End: 2023-08-26

## 2023-08-23 RX ORDER — ACETAMINOPHEN 325 MG/1
650 TABLET ORAL EVERY 4 HOURS PRN
Status: DISCONTINUED | OUTPATIENT
Start: 2023-08-23 | End: 2023-08-25 | Stop reason: SDUPTHER

## 2023-08-23 RX ORDER — CLOPIDOGREL BISULFATE 75 MG/1
75 TABLET ORAL DAILY
Status: DISCONTINUED | OUTPATIENT
Start: 2023-08-24 | End: 2023-08-24

## 2023-08-23 RX ORDER — ATORVASTATIN CALCIUM 40 MG/1
40 TABLET, FILM COATED ORAL NIGHTLY
Status: DISCONTINUED | OUTPATIENT
Start: 2023-08-23 | End: 2023-08-28 | Stop reason: HOSPADM

## 2023-08-23 RX ORDER — SODIUM CHLORIDE 0.9 % (FLUSH) 0.9 %
5-40 SYRINGE (ML) INJECTION PRN
Status: DISCONTINUED | OUTPATIENT
Start: 2023-08-23 | End: 2023-08-28 | Stop reason: HOSPADM

## 2023-08-23 RX ORDER — SODIUM CHLORIDE 9 MG/ML
INJECTION, SOLUTION INTRAVENOUS ONCE
Status: COMPLETED | OUTPATIENT
Start: 2023-08-23 | End: 2023-08-23

## 2023-08-23 RX ORDER — SODIUM CHLORIDE 0.9 % (FLUSH) 0.9 %
5-40 SYRINGE (ML) INJECTION EVERY 12 HOURS SCHEDULED
Status: DISCONTINUED | OUTPATIENT
Start: 2023-08-23 | End: 2023-08-28 | Stop reason: HOSPADM

## 2023-08-23 RX ORDER — SODIUM CHLORIDE 9 MG/ML
INJECTION, SOLUTION INTRAVENOUS PRN
Status: DISCONTINUED | OUTPATIENT
Start: 2023-08-23 | End: 2023-08-28 | Stop reason: HOSPADM

## 2023-08-23 RX ADMIN — ACETAMINOPHEN 650 MG: 325 TABLET ORAL at 16:52

## 2023-08-23 RX ADMIN — ACETAMINOPHEN 650 MG: 325 TABLET ORAL at 20:59

## 2023-08-23 RX ADMIN — ASPIRIN 325 MG: 325 TABLET ORAL at 12:10

## 2023-08-23 RX ADMIN — ATORVASTATIN CALCIUM 40 MG: 40 TABLET, FILM COATED ORAL at 20:59

## 2023-08-23 RX ADMIN — SODIUM CHLORIDE: 9 INJECTION, SOLUTION INTRAVENOUS at 12:14

## 2023-08-23 RX ADMIN — SODIUM CHLORIDE, PRESERVATIVE FREE 10 ML: 5 INJECTION INTRAVENOUS at 20:59

## 2023-08-23 RX ADMIN — SODIUM CHLORIDE: 9 INJECTION, SOLUTION INTRAVENOUS at 12:11

## 2023-08-23 RX ADMIN — METOPROLOL SUCCINATE 25 MG: 25 TABLET, EXTENDED RELEASE ORAL at 16:14

## 2023-08-23 ASSESSMENT — PAIN DESCRIPTION - ORIENTATION: ORIENTATION: LOWER;MID

## 2023-08-23 ASSESSMENT — PAIN SCALES - GENERAL
PAINLEVEL_OUTOF10: 0
PAINLEVEL_OUTOF10: 3
PAINLEVEL_OUTOF10: 6
PAINLEVEL_OUTOF10: 0

## 2023-08-23 ASSESSMENT — PAIN DESCRIPTION - DESCRIPTORS
DESCRIPTORS: ACHING
DESCRIPTORS: ACHING

## 2023-08-23 ASSESSMENT — PAIN DESCRIPTION - LOCATION
LOCATION: BACK
LOCATION: BACK

## 2023-08-23 ASSESSMENT — PAIN - FUNCTIONAL ASSESSMENT: PAIN_FUNCTIONAL_ASSESSMENT: ACTIVITIES ARE NOT PREVENTED

## 2023-08-23 ASSESSMENT — PAIN DESCRIPTION - FREQUENCY: FREQUENCY: INTERMITTENT

## 2023-08-23 ASSESSMENT — PAIN DESCRIPTION - ONSET: ONSET: GRADUAL

## 2023-08-23 ASSESSMENT — PAIN DESCRIPTION - PAIN TYPE: TYPE: ACUTE PAIN

## 2023-08-23 NOTE — PROCEDURES
[address], [address] [place] [address]                            CARDIAC CATHETERIZATION    PATIENT NAME: Dave Duncan                 :        1965  MED REC NO:   [id number]                            ROOM:       6326  ACCOUNT NO:   [de-identified]                           ADMIT DATE: 2023  PROVIDER:     Puja Joya MD    DATE OF PROCEDURE:  2023    LEFT HEART CATHETERIZATION WITH FAILED PCI OF THE CIRCUMFLEX ARTERY    REFERRING PHYSICIAN:  Paul Toussaint MD    INDICATION:  Dyspnea on exertion and positive exercise nuclear stress test.    PROCEDURE NOTE:  After obtaining a signed consent from the patient, he  was brought to the cardiac cath lab in usual fasting state. Under  sterile condition and under local anesthetic and using conscious  sedation, a 5-Mexican sheath was inserted into his right common femoral  artery using a micropuncture needle under ultrasound guidance. Then, a  5-Mexican JL-4 diagnostic catheter was advanced over a J-tip wire to the  ascending aorta without difficulty. This catheter failed to engage the  left main coronary artery. It was exchanged over a guidewire to a  5-Mexican JL-5 diagnostic catheter which was able to engage the left main  coronary artery and angiogram was done. This catheter was exchanged  over a guidewire to a 5-Mexican JR-4 diagnostic catheter which was able  to engage the right coronary and angiogram was done. The same catheter  was used to engage the SVG attached to a diagonal branch and an  angiogram was done. This catheter was also used to engage an SVG  attached to the RCA and angiogram was done. This catheter failed to  engage the other two vein grafts. This catheter was then advanced into  the left subclavian artery and the LIMA was advanced over an exchange  wire to subclavian artery. The LIMA was engaged and angiogram was done.   This catheter was exchanged

## 2023-08-24 PROBLEM — I25.119 CORONARY ARTERY DISEASE INVOLVING NATIVE CORONARY ARTERY OF NATIVE HEART WITH ANGINA PECTORIS (HCC): Status: ACTIVE | Noted: 2023-08-23

## 2023-08-24 PROBLEM — Z95.1 STATUS POST CORONARY ARTERY BYPASS GRAFT: Status: ACTIVE | Noted: 2023-08-24

## 2023-08-24 LAB
ANION GAP SERPL CALCULATED.3IONS-SCNC: 10 MMOL/L (ref 7–16)
BUN SERPL-MCNC: 9 MG/DL (ref 6–20)
CALCIUM SERPL-MCNC: 8.7 MG/DL (ref 8.6–10.2)
CHLORIDE SERPL-SCNC: 105 MMOL/L (ref 98–107)
CO2 SERPL-SCNC: 23 MMOL/L (ref 22–29)
CREAT SERPL-MCNC: 0.9 MG/DL (ref 0.7–1.2)
ERYTHROCYTE [DISTWIDTH] IN BLOOD BY AUTOMATED COUNT: 12.9 % (ref 11.5–15)
GFR SERPL CREATININE-BSD FRML MDRD: >60 ML/MIN/1.73M2
GLUCOSE SERPL-MCNC: 104 MG/DL (ref 74–99)
HCT VFR BLD AUTO: 42.7 % (ref 37–54)
HGB BLD-MCNC: 14.3 G/DL (ref 12.5–16.5)
MAGNESIUM SERPL-MCNC: 2 MG/DL (ref 1.6–2.6)
MCH RBC QN AUTO: 30.5 PG (ref 26–35)
MCHC RBC AUTO-ENTMCNC: 33.5 G/DL (ref 32–34.5)
MCV RBC AUTO: 91 FL (ref 80–99.9)
PLATELET # BLD AUTO: 229 K/UL (ref 130–450)
PMV BLD AUTO: 9.7 FL (ref 7–12)
POTASSIUM SERPL-SCNC: 3.9 MMOL/L (ref 3.5–5)
RBC # BLD AUTO: 4.69 M/UL (ref 3.8–5.8)
SODIUM SERPL-SCNC: 138 MMOL/L (ref 132–146)
WBC OTHER # BLD: 7.4 K/UL (ref 4.5–11.5)

## 2023-08-24 PROCEDURE — 6370000000 HC RX 637 (ALT 250 FOR IP): Performed by: INTERNAL MEDICINE

## 2023-08-24 PROCEDURE — 2140000000 HC CCU INTERMEDIATE R&B

## 2023-08-24 PROCEDURE — 85027 COMPLETE CBC AUTOMATED: CPT

## 2023-08-24 PROCEDURE — 2580000003 HC RX 258: Performed by: INTERNAL MEDICINE

## 2023-08-24 PROCEDURE — 83735 ASSAY OF MAGNESIUM: CPT

## 2023-08-24 PROCEDURE — 99232 SBSQ HOSP IP/OBS MODERATE 35: CPT | Performed by: INTERNAL MEDICINE

## 2023-08-24 PROCEDURE — 36415 COLL VENOUS BLD VENIPUNCTURE: CPT

## 2023-08-24 PROCEDURE — 80048 BASIC METABOLIC PNL TOTAL CA: CPT

## 2023-08-24 RX ORDER — CLOPIDOGREL BISULFATE 75 MG/1
75 TABLET ORAL DAILY
Status: DISCONTINUED | OUTPATIENT
Start: 2023-08-25 | End: 2023-08-28 | Stop reason: HOSPADM

## 2023-08-24 RX ORDER — CLOPIDOGREL 300 MG/1
300 TABLET, FILM COATED ORAL ONCE
Status: DISCONTINUED | OUTPATIENT
Start: 2023-08-24 | End: 2023-08-24

## 2023-08-24 RX ORDER — CLOPIDOGREL BISULFATE 75 MG/1
225 TABLET ORAL ONCE
Status: COMPLETED | OUTPATIENT
Start: 2023-08-24 | End: 2023-08-24

## 2023-08-24 RX ADMIN — ASPIRIN 81 MG: 81 TABLET, COATED ORAL at 08:29

## 2023-08-24 RX ADMIN — CLOPIDOGREL BISULFATE 225 MG: 75 TABLET ORAL at 09:47

## 2023-08-24 RX ADMIN — SODIUM CHLORIDE, PRESERVATIVE FREE 10 ML: 5 INJECTION INTRAVENOUS at 08:30

## 2023-08-24 RX ADMIN — SODIUM CHLORIDE, PRESERVATIVE FREE 10 ML: 5 INJECTION INTRAVENOUS at 20:06

## 2023-08-24 RX ADMIN — ATORVASTATIN CALCIUM 40 MG: 40 TABLET, FILM COATED ORAL at 20:06

## 2023-08-24 RX ADMIN — CLOPIDOGREL BISULFATE 75 MG: 75 TABLET ORAL at 08:29

## 2023-08-24 RX ADMIN — METOPROLOL SUCCINATE 25 MG: 25 TABLET, EXTENDED RELEASE ORAL at 08:29

## 2023-08-24 ASSESSMENT — PAIN SCALES - GENERAL
PAINLEVEL_OUTOF10: 0
PAINLEVEL_OUTOF10: 0

## 2023-08-24 NOTE — CARE COORDINATION
8/24, Patient had a heart cath 8/23/23. Patient was not available to speak with. SW will follow for any home going needs.       Levi Shepherd, [facility] Case Management  [phone]

## 2023-08-24 NOTE — H&P
[facility]  History and Physical      CHIEF COMPLAINT:    No chief complaint on file. Patient of [facility],  presents with:  <principal problem not specified>    History of Present Illness:   Patient is a 47-year-old male with a past medical history of AAA, CAD, HLD, HTN, CABG x5 in 2009 who presents to the hospital for scheduled cardiac catheterization secondary to a abnormal stress test outpatient. Patient is status post cardiac catheterization which revealed severe triple-vessel coronary artery disease with multiple occlusions. Patient is admitted to intermediate telemetry and for further work-up and evaluation with cardiology. On evaluation he is resting comfortably in no acute distress. He is awaiting input from cardiology regarding possible atherectomy which is to be planned either today or tomorrow. Family is at bedside. Patient is asymptomatic      REVIEW OF SYSTEMS:  Pertinent negatives are above in HPI. 10 point ROS otherwise negative. Past Medical History:   Diagnosis Date    Ascending aortic aneurysm (HCC)     CAD (coronary artery disease)     Cardiac ischemia     Chronic back pain     Hyperlipidemia     Hypertension     MI (myocardial infarction) ([address])     S/P coronary artery bypass graft x 5 04/15/09    DR. SUAREZ         Past Surgical History:   Procedure Laterality Date    APPENDECTOMY      CARDIAC CATHETERIZATION  08/23/2023    Dr. Maguire Guess GRAFT  04/15/2009    EMERGENT ACB X 5/ DR. SUAREZ    CORONARY ARTERY BYPASS GRAFT      DIAGNOSTIC CARDIAC CATH LAB PROCEDURE  04/15/2009    Braxton Singleton M.D. FOOT SURGERY      UPPER GASTROINTESTINAL ENDOSCOPY  05/24/2013    removal foreign body    VASECTOMY         Medications Prior to Admission:    Medications Prior to Admission: HYDROcodone-acetaminophen ([address]) 7.5-325 MG per tablet, as needed.   ibuprofen (ADVIL;MOTRIN) 800 MG tablet, as needed  sacubitril-valsartan (ENTRESTO) 24-26 MG per

## 2023-08-25 ENCOUNTER — APPOINTMENT (OUTPATIENT)
Dept: CARDIAC CATH/INVASIVE PROCEDURES | Age: 58
DRG: 247 | End: 2023-08-25
Attending: INTERNAL MEDICINE
Payer: COMMERCIAL

## 2023-08-25 LAB
ACTIVATED CLOTTING TIME, LOW RANGE: 297 SEC
ACTIVATED CLOTTING TIME, LOW RANGE: 379 SEC
ANION GAP SERPL CALCULATED.3IONS-SCNC: 9 MMOL/L (ref 7–16)
BASOPHILS # BLD: 0.07 K/UL (ref 0–0.2)
BASOPHILS NFR BLD: 1 % (ref 0–2)
BUN SERPL-MCNC: 9 MG/DL (ref 6–20)
CALCIUM SERPL-MCNC: 8.9 MG/DL (ref 8.6–10.2)
CHLORIDE SERPL-SCNC: 103 MMOL/L (ref 98–107)
CO2 SERPL-SCNC: 24 MMOL/L (ref 22–29)
CREAT SERPL-MCNC: 1 MG/DL (ref 0.7–1.2)
EOSINOPHIL # BLD: 0.21 K/UL (ref 0.05–0.5)
EOSINOPHILS RELATIVE PERCENT: 3 % (ref 0–6)
ERYTHROCYTE [DISTWIDTH] IN BLOOD BY AUTOMATED COUNT: 13.1 % (ref 11.5–15)
GFR SERPL CREATININE-BSD FRML MDRD: >60 ML/MIN/1.73M2
GLUCOSE SERPL-MCNC: 107 MG/DL (ref 74–99)
HCT VFR BLD AUTO: 43.6 % (ref 37–54)
HGB BLD-MCNC: 14.5 G/DL (ref 12.5–16.5)
IMM GRANULOCYTES # BLD AUTO: <0.03 K/UL (ref 0–0.58)
IMM GRANULOCYTES NFR BLD: 0 % (ref 0–5)
LYMPHOCYTES NFR BLD: 1.64 K/UL (ref 1.5–4)
LYMPHOCYTES RELATIVE PERCENT: 25 % (ref 20–42)
MCH RBC QN AUTO: 30.2 PG (ref 26–35)
MCHC RBC AUTO-ENTMCNC: 33.3 G/DL (ref 32–34.5)
MCV RBC AUTO: 90.8 FL (ref 80–99.9)
MONOCYTES NFR BLD: 0.49 K/UL (ref 0.1–0.95)
MONOCYTES NFR BLD: 7 % (ref 2–12)
NEUTROPHILS NFR BLD: 63 % (ref 43–80)
NEUTS SEG NFR BLD: 4.19 K/UL (ref 1.8–7.3)
PLATELET # BLD AUTO: 237 K/UL (ref 130–450)
PMV BLD AUTO: 9.7 FL (ref 7–12)
POTASSIUM SERPL-SCNC: 3.7 MMOL/L (ref 3.5–5)
RBC # BLD AUTO: 4.8 M/UL (ref 3.8–5.8)
SODIUM SERPL-SCNC: 136 MMOL/L (ref 132–146)
WBC OTHER # BLD: 6.6 K/UL (ref 4.5–11.5)

## 2023-08-25 PROCEDURE — 6370000000 HC RX 637 (ALT 250 FOR IP): Performed by: INTERNAL MEDICINE

## 2023-08-25 PROCEDURE — 2140000000 HC CCU INTERMEDIATE R&B

## 2023-08-25 PROCEDURE — 2709999900 HC NON-CHARGEABLE SUPPLY

## 2023-08-25 PROCEDURE — 027034Z DILATION OF CORONARY ARTERY, ONE ARTERY WITH DRUG-ELUTING INTRALUMINAL DEVICE, PERCUTANEOUS APPROACH: ICD-10-PCS | Performed by: INTERNAL MEDICINE

## 2023-08-25 PROCEDURE — C1887 CATHETER, GUIDING: HCPCS

## 2023-08-25 PROCEDURE — C1874 STENT, COATED/COV W/DEL SYS: HCPCS

## 2023-08-25 PROCEDURE — 80048 BASIC METABOLIC PNL TOTAL CA: CPT

## 2023-08-25 PROCEDURE — 2580000003 HC RX 258: Performed by: INTERNAL MEDICINE

## 2023-08-25 PROCEDURE — C1769 GUIDE WIRE: HCPCS

## 2023-08-25 PROCEDURE — C1760 CLOSURE DEV, VASC: HCPCS

## 2023-08-25 PROCEDURE — B41F1ZZ FLUOROSCOPY OF RIGHT LOWER EXTREMITY ARTERIES USING LOW OSMOLAR CONTRAST: ICD-10-PCS | Performed by: INTERNAL MEDICINE

## 2023-08-25 PROCEDURE — 2500000003 HC RX 250 WO HCPCS

## 2023-08-25 PROCEDURE — C1725 CATH, TRANSLUMIN NON-LASER: HCPCS

## 2023-08-25 PROCEDURE — 36415 COLL VENOUS BLD VENIPUNCTURE: CPT

## 2023-08-25 PROCEDURE — 93005 ELECTROCARDIOGRAM TRACING: CPT | Performed by: INTERNAL MEDICINE

## 2023-08-25 PROCEDURE — 92928 PRQ TCAT PLMT NTRAC ST 1 LES: CPT

## 2023-08-25 PROCEDURE — 85347 COAGULATION TIME ACTIVATED: CPT

## 2023-08-25 PROCEDURE — C1894 INTRO/SHEATH, NON-LASER: HCPCS

## 2023-08-25 PROCEDURE — 85025 COMPLETE CBC W/AUTO DIFF WBC: CPT

## 2023-08-25 PROCEDURE — 6360000002 HC RX W HCPCS

## 2023-08-25 RX ORDER — ACETAMINOPHEN 325 MG/1
650 TABLET ORAL EVERY 4 HOURS PRN
Status: DISCONTINUED | OUTPATIENT
Start: 2023-08-25 | End: 2023-08-28 | Stop reason: HOSPADM

## 2023-08-25 RX ORDER — SODIUM CHLORIDE 9 MG/ML
INJECTION, SOLUTION INTRAVENOUS CONTINUOUS
Status: DISCONTINUED | OUTPATIENT
Start: 2023-08-25 | End: 2023-08-28 | Stop reason: HOSPADM

## 2023-08-25 RX ADMIN — ACETAMINOPHEN 650 MG: 325 TABLET ORAL at 19:45

## 2023-08-25 RX ADMIN — CLOPIDOGREL 75 MG: 75 TABLET, FILM COATED ORAL at 07:38

## 2023-08-25 RX ADMIN — METOPROLOL SUCCINATE 25 MG: 25 TABLET, EXTENDED RELEASE ORAL at 14:09

## 2023-08-25 RX ADMIN — SODIUM CHLORIDE, PRESERVATIVE FREE 10 ML: 5 INJECTION INTRAVENOUS at 14:15

## 2023-08-25 RX ADMIN — ACETAMINOPHEN 650 MG: 325 TABLET ORAL at 14:14

## 2023-08-25 RX ADMIN — SODIUM CHLORIDE: 9 INJECTION, SOLUTION INTRAVENOUS at 19:17

## 2023-08-25 RX ADMIN — ATORVASTATIN CALCIUM 40 MG: 40 TABLET, FILM COATED ORAL at 21:25

## 2023-08-25 RX ADMIN — SODIUM CHLORIDE, PRESERVATIVE FREE 10 ML: 5 INJECTION INTRAVENOUS at 21:25

## 2023-08-25 RX ADMIN — ASPIRIN 81 MG: 81 TABLET, COATED ORAL at 07:38

## 2023-08-25 ASSESSMENT — PAIN SCALES - GENERAL
PAINLEVEL_OUTOF10: 5
PAINLEVEL_OUTOF10: 2
PAINLEVEL_OUTOF10: 0
PAINLEVEL_OUTOF10: 0
PAINLEVEL_OUTOF10: 4

## 2023-08-25 ASSESSMENT — PAIN - FUNCTIONAL ASSESSMENT
PAIN_FUNCTIONAL_ASSESSMENT: PREVENTS OR INTERFERES SOME ACTIVE ACTIVITIES AND ADLS
PAIN_FUNCTIONAL_ASSESSMENT: ACTIVITIES ARE NOT PREVENTED

## 2023-08-25 ASSESSMENT — PAIN DESCRIPTION - DESCRIPTORS
DESCRIPTORS: ACHING
DESCRIPTORS: ACHING;DISCOMFORT

## 2023-08-25 ASSESSMENT — PAIN DESCRIPTION - ORIENTATION
ORIENTATION: MID;POSTERIOR
ORIENTATION: MID;LEFT

## 2023-08-25 ASSESSMENT — PAIN DESCRIPTION - LOCATION
LOCATION: BACK
LOCATION: BACK

## 2023-08-25 NOTE — PROCEDURES
[address], [address] [place] [address]                            CARDIAC CATHETERIZATION    PATIENT NAME: Blanca Lin                 :        1965  MED REC NO:   [id number]                            ROOM:       6326  ACCOUNT NO:   [de-identified]                           ADMIT DATE: 2023  PROVIDER:     Leland Hernandez MD    DATE OF PROCEDURE:  2023    SURGEON:  Leland Hernandez MD    PROCEDURES PERFORMED:  1. Percutaneous coronary intervention with a deployment of 2.5 x 18 mm  Lincoln Marland drug-eluting stent in proximal left circumflex artery. 2.  Deployment of ProGlide ProStyle suture closure device in the right  common femoral artery. 3.  Conscious sedation using Versed and fentanyl. Indication #16, score of 8. INDICATION FOR PROCEDURE:  The patient is a 25-year-old male with  abnormal stress test who had cardiac catheterization the other day. The  patient did receive first stage PCI to left circumflex artery. DESCRIPTION OF PROCEDURE:  After the appropriate informed consent, the  right groin area was prepped and draped in the usual sterile fashion. A  timeout was completed. The right groin area was locally anesthetized  with 2 mL of 2% lidocaine. A Cook needle was used to access the right  femoral artery. A 7-Finnish sheath was used to cannulate the right  femoral artery. A 7-Finnish EBU 4 guiding catheter was used to engage  the vessel. A BMW wire was used to cross the lesion. A 6-Finnish  Telescope extension catheter was used for extra support. The lesion was  predilated multiple times using 1.5 balloon, 2.0, 2.5, then 2.0  noncompliant, then 2.5 noncompliant, then the lesion was stented using a  2.5 x 18 mm Lincoln Marland drug-eluting stent. The final angiography  showed no dissection or perforation. At that time, the catheter and the  wire were pulled out from the body.   Limited right

## 2023-08-25 NOTE — PLAN OF CARE
Problem: Discharge Planning  Goal: Discharge to home or other facility with appropriate resources  8/25/2023 1133 by Eunice Wesley RN  Outcome: Progressing  Flowsheets (Taken 8/25/2023 1109)  Discharge to home or other facility with appropriate resources: Identify barriers to discharge with patient and caregiver  8/24/2023 9401 by Kali Baez, RN  Outcome: Progressing

## 2023-08-26 LAB
ANION GAP SERPL CALCULATED.3IONS-SCNC: 10 MMOL/L (ref 7–16)
BUN SERPL-MCNC: 9 MG/DL (ref 6–20)
CALCIUM SERPL-MCNC: 8.6 MG/DL (ref 8.6–10.2)
CHLORIDE SERPL-SCNC: 100 MMOL/L (ref 98–107)
CO2 SERPL-SCNC: 24 MMOL/L (ref 22–29)
CREAT SERPL-MCNC: 0.9 MG/DL (ref 0.7–1.2)
ERYTHROCYTE [DISTWIDTH] IN BLOOD BY AUTOMATED COUNT: 12.9 % (ref 11.5–15)
GFR SERPL CREATININE-BSD FRML MDRD: >60 ML/MIN/1.73M2
GLUCOSE SERPL-MCNC: 105 MG/DL (ref 74–99)
HCT VFR BLD AUTO: 40.6 % (ref 37–54)
HGB BLD-MCNC: 13.7 G/DL (ref 12.5–16.5)
LV EF: 35 %
LVEF MODALITY: NORMAL
MAGNESIUM SERPL-MCNC: 1.9 MG/DL (ref 1.6–2.6)
MCH RBC QN AUTO: 30.6 PG (ref 26–35)
MCHC RBC AUTO-ENTMCNC: 33.7 G/DL (ref 32–34.5)
MCV RBC AUTO: 90.8 FL (ref 80–99.9)
PLATELET # BLD AUTO: 206 K/UL (ref 130–450)
PMV BLD AUTO: 9.9 FL (ref 7–12)
POTASSIUM SERPL-SCNC: 4 MMOL/L (ref 3.5–5)
RBC # BLD AUTO: 4.47 M/UL (ref 3.8–5.8)
SODIUM SERPL-SCNC: 134 MMOL/L (ref 132–146)
WBC OTHER # BLD: 7.1 K/UL (ref 4.5–11.5)

## 2023-08-26 PROCEDURE — 93306 TTE W/DOPPLER COMPLETE: CPT

## 2023-08-26 PROCEDURE — 6360000004 HC RX CONTRAST MEDICATION: Performed by: INTERNAL MEDICINE

## 2023-08-26 PROCEDURE — 6370000000 HC RX 637 (ALT 250 FOR IP): Performed by: INTERNAL MEDICINE

## 2023-08-26 PROCEDURE — 80048 BASIC METABOLIC PNL TOTAL CA: CPT

## 2023-08-26 PROCEDURE — 2140000000 HC CCU INTERMEDIATE R&B

## 2023-08-26 PROCEDURE — 83735 ASSAY OF MAGNESIUM: CPT

## 2023-08-26 PROCEDURE — 2580000003 HC RX 258: Performed by: INTERNAL MEDICINE

## 2023-08-26 PROCEDURE — 36415 COLL VENOUS BLD VENIPUNCTURE: CPT

## 2023-08-26 PROCEDURE — 85027 COMPLETE CBC AUTOMATED: CPT

## 2023-08-26 PROCEDURE — 99233 SBSQ HOSP IP/OBS HIGH 50: CPT | Performed by: INTERNAL MEDICINE

## 2023-08-26 RX ORDER — CLOPIDOGREL BISULFATE 75 MG/1
75 TABLET ORAL DAILY
Qty: 30 TABLET | Refills: 3 | Status: SHIPPED | OUTPATIENT
Start: 2023-08-27

## 2023-08-26 RX ORDER — LOSARTAN POTASSIUM 50 MG/1
25 TABLET ORAL DAILY
Status: DISCONTINUED | OUTPATIENT
Start: 2023-08-26 | End: 2023-08-28 | Stop reason: HOSPADM

## 2023-08-26 RX ORDER — METOPROLOL SUCCINATE 25 MG/1
25 TABLET, EXTENDED RELEASE ORAL DAILY
Qty: 30 TABLET | Refills: 3 | Status: SHIPPED | OUTPATIENT
Start: 2023-08-27

## 2023-08-26 RX ORDER — METOPROLOL SUCCINATE 25 MG/1
50 TABLET, EXTENDED RELEASE ORAL DAILY
Status: DISCONTINUED | OUTPATIENT
Start: 2023-08-27 | End: 2023-08-28 | Stop reason: HOSPADM

## 2023-08-26 RX ORDER — ATORVASTATIN CALCIUM 40 MG/1
40 TABLET, FILM COATED ORAL NIGHTLY
Qty: 30 TABLET | Refills: 3 | Status: SHIPPED | OUTPATIENT
Start: 2023-08-26

## 2023-08-26 RX ADMIN — ASPIRIN 81 MG: 81 TABLET, COATED ORAL at 10:15

## 2023-08-26 RX ADMIN — SODIUM CHLORIDE, PRESERVATIVE FREE 10 ML: 5 INJECTION INTRAVENOUS at 20:59

## 2023-08-26 RX ADMIN — CLOPIDOGREL 75 MG: 75 TABLET, FILM COATED ORAL at 10:15

## 2023-08-26 RX ADMIN — METOPROLOL SUCCINATE 25 MG: 25 TABLET, EXTENDED RELEASE ORAL at 10:15

## 2023-08-26 RX ADMIN — LOSARTAN POTASSIUM 25 MG: 50 TABLET, FILM COATED ORAL at 18:00

## 2023-08-26 RX ADMIN — ATORVASTATIN CALCIUM 40 MG: 40 TABLET, FILM COATED ORAL at 20:58

## 2023-08-26 RX ADMIN — PERFLUTREN 4 ML: 6.52 INJECTION, SUSPENSION INTRAVENOUS at 18:36

## 2023-08-26 RX ADMIN — SODIUM CHLORIDE, PRESERVATIVE FREE 10 ML: 5 INJECTION INTRAVENOUS at 10:00

## 2023-08-26 ASSESSMENT — PAIN DESCRIPTION - PAIN TYPE: TYPE: ACUTE PAIN

## 2023-08-26 ASSESSMENT — PAIN - FUNCTIONAL ASSESSMENT: PAIN_FUNCTIONAL_ASSESSMENT: ACTIVITIES ARE NOT PREVENTED

## 2023-08-26 ASSESSMENT — PAIN DESCRIPTION - DESCRIPTORS: DESCRIPTORS: SORE

## 2023-08-26 ASSESSMENT — PAIN SCALES - GENERAL: PAINLEVEL_OUTOF10: 1

## 2023-08-26 ASSESSMENT — PAIN DESCRIPTION - LOCATION: LOCATION: GROIN

## 2023-08-26 ASSESSMENT — PAIN DESCRIPTION - ORIENTATION: ORIENTATION: RIGHT

## 2023-08-26 NOTE — DISCHARGE INSTRUCTIONS
Cardiac Rehabilitation: Discharge instructions        Cardiac rehabilitation is a program for people who have a heart problem, such as a heart attack, coronary stent placed, heart failure, or a heart valve disease. The program includes exercise, lifestyle changes, education, and emotional support. Cardiac rehab can help you improve the quality of your life through better overall health. It can help you lose weight and feel better about yourself. On your cardiac rehab team, you may have your doctor, a nurse specialist, an exercise physiologist, and a dietitian. They will design your cardiac rehab program specifically for you. You will learn how to reduce your risk for heart problems, how to manage stress, and how to eat a heart-healthy diet. By the end of the program, you will be ready to maintain a healthier lifestyle on your own. Follow-up care is a key part of your treatment and safety. Be sure to make and go to all appointments, and call your doctor if you are having problems. It's also a good idea to know your test results and keep a list of the medicines you take. Please call to schedule your first appointment once you have been cleared by your cardiologist to attend Phase II Outpatient Cardiac Rehabilitation. Cardiac Rehabilitation Options:  [facility] Estes  [address] [facility].                                                                                           Cardiology Services  [facility], [address]  Hours: M/W/F 7am-7pm & T/Th 7am-12pm                                                  [phone]

## 2023-08-26 NOTE — PLAN OF CARE
Problem: Discharge Planning  Goal: Discharge to home or other facility with appropriate resources  8/26/2023 0128 by Karen Lepe RN  Outcome: Progressing     Problem: ABCDS Injury Assessment  Goal: Absence of physical injury  8/26/2023 0128 by Karen Lepe RN  Outcome: Progressing     Problem: Pain  Goal: Verbalizes/displays adequate comfort level or baseline comfort level  8/26/2023 0128 by Karen Lepe RN  Outcome: Progressing     Problem: Safety - Adult  Goal: Free from fall injury  8/25/2023 1133 by Linus Lopez RN  Outcome: Progressing

## 2023-08-26 NOTE — DISCHARGE SUMMARY
as tolerated  Diet: cardiac diet    Pt has been advised to: Follow-up with Fatou Garcia DO in 1 week. Follow-up with consultants as recommended by them    Note that over 30 minutes was spent in preparing discharge papers, discussing discharge with patient, medication review, etc.    Signed:  SCOT Persaud CNP  8/26/2023  1:55 PM     Above note edited to reflect my thoughts    I personally saw, examined and provided care for the patient. Radiographs, labs and medication list were reviewed by me independently. The case was discussed in detail and plans for care were established. Review of SCOT Persaud CNP, documentation was conducted and revisions were made as appropriate directly by me. I agree with the above documented exam, problem list, and plan of care.      Katherine Schneider MD

## 2023-08-27 PROBLEM — I47.29 NSVT (NONSUSTAINED VENTRICULAR TACHYCARDIA) (HCC): Status: ACTIVE | Noted: 2023-08-27

## 2023-08-27 LAB
ANION GAP SERPL CALCULATED.3IONS-SCNC: 12 MMOL/L (ref 7–16)
BUN SERPL-MCNC: 8 MG/DL (ref 6–20)
CALCIUM SERPL-MCNC: 9.4 MG/DL (ref 8.6–10.2)
CHLORIDE SERPL-SCNC: 101 MMOL/L (ref 98–107)
CO2 SERPL-SCNC: 24 MMOL/L (ref 22–29)
CREAT SERPL-MCNC: 1 MG/DL (ref 0.7–1.2)
GFR SERPL CREATININE-BSD FRML MDRD: >60 ML/MIN/1.73M2
GLUCOSE SERPL-MCNC: 93 MG/DL (ref 74–99)
POTASSIUM SERPL-SCNC: 4.2 MMOL/L (ref 3.5–5)
SODIUM SERPL-SCNC: 137 MMOL/L (ref 132–146)

## 2023-08-27 PROCEDURE — 2140000000 HC CCU INTERMEDIATE R&B

## 2023-08-27 PROCEDURE — 36415 COLL VENOUS BLD VENIPUNCTURE: CPT

## 2023-08-27 PROCEDURE — 99232 SBSQ HOSP IP/OBS MODERATE 35: CPT | Performed by: INTERNAL MEDICINE

## 2023-08-27 PROCEDURE — 99254 IP/OBS CNSLTJ NEW/EST MOD 60: CPT | Performed by: STUDENT IN AN ORGANIZED HEALTH CARE EDUCATION/TRAINING PROGRAM

## 2023-08-27 PROCEDURE — 6370000000 HC RX 637 (ALT 250 FOR IP): Performed by: INTERNAL MEDICINE

## 2023-08-27 PROCEDURE — 2580000003 HC RX 258: Performed by: INTERNAL MEDICINE

## 2023-08-27 PROCEDURE — 6370000000 HC RX 637 (ALT 250 FOR IP): Performed by: NURSE PRACTITIONER

## 2023-08-27 PROCEDURE — 80048 BASIC METABOLIC PNL TOTAL CA: CPT

## 2023-08-27 RX ORDER — LORAZEPAM 1 MG/1
1 TABLET ORAL ONCE
Status: COMPLETED | OUTPATIENT
Start: 2023-08-27 | End: 2023-08-27

## 2023-08-27 RX ADMIN — CLOPIDOGREL 75 MG: 75 TABLET, FILM COATED ORAL at 10:26

## 2023-08-27 RX ADMIN — ACETAMINOPHEN 650 MG: 325 TABLET ORAL at 10:33

## 2023-08-27 RX ADMIN — LOSARTAN POTASSIUM 25 MG: 50 TABLET, FILM COATED ORAL at 10:26

## 2023-08-27 RX ADMIN — SODIUM CHLORIDE, PRESERVATIVE FREE 10 ML: 5 INJECTION INTRAVENOUS at 21:28

## 2023-08-27 RX ADMIN — ASPIRIN 81 MG: 81 TABLET, COATED ORAL at 10:25

## 2023-08-27 RX ADMIN — ATORVASTATIN CALCIUM 40 MG: 40 TABLET, FILM COATED ORAL at 21:28

## 2023-08-27 RX ADMIN — SODIUM CHLORIDE, PRESERVATIVE FREE 10 ML: 5 INJECTION INTRAVENOUS at 10:28

## 2023-08-27 RX ADMIN — METOPROLOL SUCCINATE 50 MG: 25 TABLET, EXTENDED RELEASE ORAL at 10:25

## 2023-08-27 RX ADMIN — LORAZEPAM 1 MG: 1 TABLET ORAL at 23:13

## 2023-08-27 ASSESSMENT — PAIN SCALES - GENERAL: PAINLEVEL_OUTOF10: 4

## 2023-08-27 ASSESSMENT — PAIN DESCRIPTION - DESCRIPTORS: DESCRIPTORS: ACHING

## 2023-08-27 ASSESSMENT — PAIN DESCRIPTION - LOCATION: LOCATION: HEAD

## 2023-08-27 NOTE — PLAN OF CARE
Problem: Discharge Planning  Goal: Discharge to home or other facility with appropriate resources  Outcome: Progressing     Problem: ABCDS Injury Assessment  Goal: Absence of physical injury  Outcome: Progressing     Problem: Pain  Goal: Verbalizes/displays adequate comfort level or baseline comfort level  Outcome: Progressing     Problem: Safety - Adult  Goal: Free from fall injury  Outcome: Progressing     Problem: Cardiovascular - Adult  Goal: Maintains optimal cardiac output and hemodynamic stability  Outcome: Progressing     Problem: Skin/Tissue Integrity - Adult  Goal: Incisions, wounds, or drain sites healing without S/S of infection  Outcome: Progressing     Problem: Metabolic/Fluid and Electrolytes - Adult  Goal: Electrolytes maintained within normal limits  Outcome: Progressing     Problem: Metabolic/Fluid and Electrolytes - Adult  Goal: Hemodynamic stability and optimal renal function maintained  Outcome: Progressing     Problem: Metabolic/Fluid and Electrolytes - Adult  Goal: Glucose maintained within prescribed range  Outcome: Progressing

## 2023-08-27 NOTE — CONSULTS
Met with patient and discussed that their physician has ordered a referral to our outpatient Phase II Cardiac Rehabilitation program. Reviewed the benefits of cardiac rehabilitation based on their diagnosis and personal risk factors. Patient demonstrates moderate interest in Cardiac Rehabilitation at this time. Cardiac Rehabilitation brochure provided to patient/family. The Cardiac Rehabilitation Program has been provided the patient's referral information and pertinent patient details and history. The patient may call [facility]  at [phone] for additional information or questions. Contact information for [facility]  and other choices close to the patient's residence have been provided in the discharge instructions so that the patient may call and schedule an appointment when cleared by their physician.  Thank you for the referral.
with severe degeneration, occluded SVG to diagonal, OM degenerated SVG to RCA. Intervention was attempted on the circumflex however it was unsuccessful. He then underwent LHC on 08/25/2023 with Dr. Bob Kearns with proximal circumflex treated with AYANNA. He was subsequently admitted and echocardiogram revealed an LVEF of 35%. He was noted to have PVCs with up to 7 beats of NSVT on inpatient telemetry, no sustained VT seen. Patient is adamant for discharge today so that he could see his grandchild go to school tomorrow. Risk of STDs discussed. Prior cardiac testing:  LHC (08/25/2023): Successful PCI to LAD. LHC (08/23/23):  of mid LAD, mid RCA, 80% discrete calcified stenosis of circumflex status post failed PCI attempt, LIMA to LAD patent with 90% discrete apical LAD stenosis, SVG to diagonal patent with severe degenerative disease, SVG to RCA Ectatic, SVG to diagonal occluded, SVG to OM occluded, LVEF of 30-35% akinesis of the basal inferior wall. Exercise nuclear stress test (08/1/23): 4 minutes 50 seconds on Parvez protocol, peak heart rate 151 (93% of maximum predicted heart rate for age), nuclear images showed large sized severe intensity fixed perfusion defect of apex, moderate size moderate intensity reversible perfusion defect of the inferior wall, moderate size moderate intensity partially reversible perfusion defect in the inferior lateral wall, LVEF 36%. TTE (01/2/23): LVEF 50%, grossly normal LV size. ECG (1/4/2023) sinus rhythm at 59 bpm, , , QTc 405. TTE (12/28/2017): LVEF 40-45%, severe hypokinetic apex,  MUGA scan: 10/27/2009, calculated LVEF 37%, visually estimated 40%. who presents to the hospital complaining of  abnormal stress test.  Cardiac electrophysiology service is consulted for NSVT and depressed LVEF.     Patient Active Problem List    Diagnosis Date Noted    Status post coronary artery bypass graft 08/24/2023    Coronary artery disease involving native coronary

## 2023-08-28 ENCOUNTER — TELEPHONE (OUTPATIENT)
Dept: ADMINISTRATIVE | Age: 58
End: 2023-08-28

## 2023-08-28 ENCOUNTER — TELEPHONE (OUTPATIENT)
Dept: CARDIOLOGY CLINIC | Age: 58
End: 2023-08-28

## 2023-08-28 VITALS
TEMPERATURE: 98.5 F | RESPIRATION RATE: 18 BRPM | SYSTOLIC BLOOD PRESSURE: 126 MMHG | DIASTOLIC BLOOD PRESSURE: 81 MMHG | WEIGHT: 243.13 LBS | HEART RATE: 71 BPM | OXYGEN SATURATION: 96 % | HEIGHT: 73 IN | BODY MASS INDEX: 32.22 KG/M2

## 2023-08-28 DIAGNOSIS — R94.31 ABNORMAL EKG: Primary | ICD-10-CM

## 2023-08-28 DIAGNOSIS — I50.20 HFREF (HEART FAILURE WITH REDUCED EJECTION FRACTION) (HCC): Primary | ICD-10-CM

## 2023-08-28 LAB
ANION GAP SERPL CALCULATED.3IONS-SCNC: 14 MMOL/L (ref 7–16)
BUN SERPL-MCNC: 8 MG/DL (ref 6–20)
CALCIUM SERPL-MCNC: 9.3 MG/DL (ref 8.6–10.2)
CHLORIDE SERPL-SCNC: 106 MMOL/L (ref 98–107)
CO2 SERPL-SCNC: 21 MMOL/L (ref 22–29)
CREAT SERPL-MCNC: 0.9 MG/DL (ref 0.7–1.2)
EKG ATRIAL RATE: 79 BPM
EKG P AXIS: 10 DEGREES
EKG P-R INTERVAL: 160 MS
EKG Q-T INTERVAL: 378 MS
EKG QRS DURATION: 90 MS
EKG QTC CALCULATION (BAZETT): 433 MS
EKG R AXIS: -7 DEGREES
EKG T AXIS: 89 DEGREES
EKG VENTRICULAR RATE: 79 BPM
GFR SERPL CREATININE-BSD FRML MDRD: >60 ML/MIN/1.73M2
GLUCOSE SERPL-MCNC: 116 MG/DL (ref 74–99)
POTASSIUM SERPL-SCNC: 4 MMOL/L (ref 3.5–5)
SODIUM SERPL-SCNC: 141 MMOL/L (ref 132–146)

## 2023-08-28 PROCEDURE — 6370000000 HC RX 637 (ALT 250 FOR IP): Performed by: INTERNAL MEDICINE

## 2023-08-28 PROCEDURE — 2580000003 HC RX 258: Performed by: INTERNAL MEDICINE

## 2023-08-28 PROCEDURE — 93010 ELECTROCARDIOGRAM REPORT: CPT | Performed by: INTERNAL MEDICINE

## 2023-08-28 RX ADMIN — METOPROLOL SUCCINATE 50 MG: 25 TABLET, EXTENDED RELEASE ORAL at 09:09

## 2023-08-28 RX ADMIN — CLOPIDOGREL 75 MG: 75 TABLET, FILM COATED ORAL at 09:09

## 2023-08-28 RX ADMIN — SODIUM CHLORIDE, PRESERVATIVE FREE 10 ML: 5 INJECTION INTRAVENOUS at 09:10

## 2023-08-28 RX ADMIN — ASPIRIN 81 MG: 81 TABLET, COATED ORAL at 09:09

## 2023-08-28 NOTE — CARE COORDINATION
8/28/23, Patient had a cardiac cath 8/23/23. SW and CM met with patient along with wife in room. Discharge plan will be home once patient gets life vest which Tramaine from [id number] has been informed about. Patient lives with wife in a mulit level home with 3 steps to enter the home. DME owned is none and patient is independent with ADL's and still drives. [facility]acy is [facility] or [facility] on [address]. No other needs noted at this time. SW to follow.       JAQUELIN Blancas  [facility]  [phone]

## 2023-08-28 NOTE — TELEPHONE ENCOUNTER
Per Dr Angie Knox patient to be seen in 1 month for f/u    Left voicemail for patient to call and schedule

## 2023-08-29 RX ORDER — SPIRONOLACTONE 25 MG/1
25 TABLET ORAL DAILY
Qty: 30 TABLET | Refills: 5 | Status: SHIPPED | OUTPATIENT
Start: 2023-08-29

## 2023-08-29 NOTE — TELEPHONE ENCOUNTER
Discussed medication with patient     They are all correct that are listed in his chart at this time.

## 2023-08-29 NOTE — TELEPHONE ENCOUNTER
Patient called back to schedule his one month follow-up however, he is has an issue he is concerned about his echo he received in the hospital.  He stated when he was in the office for his last echo it took about 45 minutes to do. He stated at the hospital this took only about 20 minutes and the girl stated she could not get good visualization many time. With this his EF was 50% last time and now is 35% prompting a life vest to be ordered and worn. He stated he feels he needs this echo repeated here in the office just to make sure this is correct since she was stating she had a hard time with visualization. He doesn't want to wear a lifevest unless necessary.   Please advise

## 2023-09-05 RX ORDER — EZETIMIBE 10 MG/1
10 TABLET ORAL DAILY
Qty: 90 TABLET | Refills: 1 | Status: SHIPPED | OUTPATIENT
Start: 2023-09-05

## 2023-09-08 ENCOUNTER — TELEPHONE (OUTPATIENT)
Dept: CARDIOLOGY CLINIC | Age: 58
End: 2023-09-08

## 2023-09-08 ENCOUNTER — HOSPITAL ENCOUNTER (OUTPATIENT)
Age: 58
Discharge: HOME | End: 2023-09-08
Payer: COMMERCIAL

## 2023-09-08 DIAGNOSIS — I50.20 HFREF (HEART FAILURE WITH REDUCED EJECTION FRACTION) (HCC): ICD-10-CM

## 2023-09-08 LAB
ANION GAP SERPL CALCULATED.3IONS-SCNC: 9 MMOL/L (ref 7–16)
BUN SERPL-MCNC: 10 MG/DL (ref 6–20)
CALCIUM SERPL-MCNC: 9.3 MG/DL (ref 8.6–10.2)
CHLORIDE SERPL-SCNC: 101 MMOL/L (ref 98–107)
CO2 SERPL-SCNC: 25 MMOL/L (ref 22–29)
CREAT SERPL-MCNC: 1.1 MG/DL (ref 0.7–1.2)
GFR SERPL CREATININE-BSD FRML MDRD: >60 ML/MIN/1.73M2
GLUCOSE SERPL-MCNC: 103 MG/DL (ref 74–99)
POTASSIUM SERPL-SCNC: 4.5 MMOL/L (ref 3.5–5)
SODIUM SERPL-SCNC: 135 MMOL/L (ref 132–146)

## 2023-09-08 PROCEDURE — 80048 BASIC METABOLIC PNL TOTAL CA: CPT

## 2023-09-08 PROCEDURE — 36415 COLL VENOUS BLD VENIPUNCTURE: CPT

## 2023-09-08 NOTE — TELEPHONE ENCOUNTER
----- Message from Hudson Roland MD sent at 9/8/2023  3:09 PM EDT -----  Labs good  He should already have an ov  If he does not please give him one      ----- Message -----  From: Gutierrez Mathis Incoming Lab Results From [address]: 9/8/2023  11:54 AM EDT  To: Hudson Roland MD

## 2023-09-12 PROBLEM — Z95.1 STATUS POST CORONARY ARTERY BYPASS GRAFT: Status: RESOLVED | Noted: 2023-08-24 | Resolved: 2023-09-12

## 2023-09-12 PROBLEM — I25.119 CORONARY ARTERY DISEASE INVOLVING NATIVE CORONARY ARTERY OF NATIVE HEART WITH ANGINA PECTORIS (HCC): Status: RESOLVED | Noted: 2023-08-23 | Resolved: 2023-09-12

## 2023-09-14 ENCOUNTER — TELEPHONE (OUTPATIENT)
Dept: ADMINISTRATIVE | Age: 58
End: 2023-09-14

## 2023-09-14 ENCOUNTER — OFFICE VISIT (OUTPATIENT)
Dept: CARDIOLOGY CLINIC | Age: 58
End: 2023-09-14
Payer: COMMERCIAL

## 2023-09-14 VITALS
SYSTOLIC BLOOD PRESSURE: 120 MMHG | RESPIRATION RATE: 18 BRPM | WEIGHT: 245 LBS | HEART RATE: 68 BPM | DIASTOLIC BLOOD PRESSURE: 62 MMHG | BODY MASS INDEX: 32.47 KG/M2 | HEIGHT: 73 IN

## 2023-09-14 DIAGNOSIS — I50.20 HFREF (HEART FAILURE WITH REDUCED EJECTION FRACTION) (HCC): ICD-10-CM

## 2023-09-14 DIAGNOSIS — I25.10 ATHEROSCLEROSIS OF NATIVE CORONARY ARTERY OF NATIVE HEART WITHOUT ANGINA PECTORIS: Primary | ICD-10-CM

## 2023-09-14 PROCEDURE — 3078F DIAST BP <80 MM HG: CPT | Performed by: INTERNAL MEDICINE

## 2023-09-14 PROCEDURE — 93000 ELECTROCARDIOGRAM COMPLETE: CPT | Performed by: INTERNAL MEDICINE

## 2023-09-14 PROCEDURE — 1111F DSCHRG MED/CURRENT MED MERGE: CPT | Performed by: INTERNAL MEDICINE

## 2023-09-14 PROCEDURE — G8417 CALC BMI ABV UP PARAM F/U: HCPCS | Performed by: INTERNAL MEDICINE

## 2023-09-14 PROCEDURE — G8427 DOCREV CUR MEDS BY ELIG CLIN: HCPCS | Performed by: INTERNAL MEDICINE

## 2023-09-14 PROCEDURE — 3017F COLORECTAL CA SCREEN DOC REV: CPT | Performed by: INTERNAL MEDICINE

## 2023-09-14 PROCEDURE — 99215 OFFICE O/P EST HI 40 MIN: CPT | Performed by: INTERNAL MEDICINE

## 2023-09-14 PROCEDURE — 1036F TOBACCO NON-USER: CPT | Performed by: INTERNAL MEDICINE

## 2023-09-14 PROCEDURE — 3074F SYST BP LT 130 MM HG: CPT | Performed by: INTERNAL MEDICINE

## 2023-09-14 RX ORDER — METOPROLOL SUCCINATE 25 MG/1
25 TABLET, EXTENDED RELEASE ORAL 2 TIMES DAILY
Qty: 30 TABLET | Refills: 3 | Status: SHIPPED
Start: 2023-09-14 | End: 2023-09-15 | Stop reason: SDUPTHER

## 2023-09-14 NOTE — PROGRESS NOTES
Subjective:      Patient ID: Oanh Davidson is a 59 y.o. male. Chief Complaint   Patient presents with    Coronary Artery Disease    Cardiomyopathy       Patient Active Problem List    Diagnosis Date Noted    NSVT (nonsustained ventricular tachycardia) ([address]) 08/27/2023    Dilated aortic root ([address]) 01/04/2023     Overview Note:     CT scan report disputed by dr Alina Barber who felt max diameter was 4.0 and recommended no need for ongoing annual scans , as stable dimension noted for 10 yrs                          Hypertension 11/14/2011    Coronary atherosclerosis of native coronary artery 11/09/2011     Overview Note: A. S/P anterior myocardial infarction 4/15/09 treated with PTCA of culprit lesion    and immediate aortic coronary bypass grafting by Dr. Wendy Dejesus. LIMA - LAD            SVG - DIAG 1            SVG - DIAG 2            SVG - OM 1            SVG - PDA  B. [facility] (08/25/2023): Successful PCI to LAD. [facility] (08/23/23):  of mid LAD, mid RCA, 80% discrete calcified stenosis of circumflex status post failed PCI attempt, LIMA to LAD patent with 90% discrete apical LAD stenosis, SVG to diagonal patent with severe degenerative disease, SVG to RCA Ectatic, SVG to diagonal occluded, SVG to OM occluded, LVEF of 30-35% akinesis of the basal inferior wall. HFrEF (heart failure with reduced ejection fraction) ([address]) 11/09/2011     Overview Note:       A. MUGA scan 10/27/09. LVEF 40%.    B. Echo 2/09/2011 [facility]): LVEF: 40-45%  C. Echo 12/28/2017:  EF 40-45%  D. Echo 1/24/2023: EF 50%  E. Vgram and TTE (poor quality study): suggest EF < 40%        Hyperlipidemia 11/09/2011       Current Outpatient Medications   Medication Sig Dispense Refill    empagliflozin (JARDIANCE) 10 MG tablet Take 1 tablet by mouth daily 30 tablet 5    metoprolol succinate (TOPROL XL) 25 MG extended release tablet Take 1 tablet by mouth 2 times daily 30 tablet 3    sacubitril-valsartan (ENTRESTO) 24-26 MG per tablet Take 1

## 2023-09-14 NOTE — TELEPHONE ENCOUNTER
Patient stated Dr. Tommy Mcgee called in [facility] to double his prescription \"metoprolol succinate (TOPROL XL) 25 MG extended release tablet\" but pharmacy stated it was wrote wrong.  Please reach back to patient at [phone]

## 2023-09-15 RX ORDER — METOPROLOL SUCCINATE 25 MG/1
25 TABLET, EXTENDED RELEASE ORAL 2 TIMES DAILY
Qty: 180 TABLET | Refills: 1 | Status: SHIPPED | OUTPATIENT
Start: 2023-09-15

## 2023-10-03 ENCOUNTER — OFFICE VISIT (OUTPATIENT)
Dept: CARDIOLOGY CLINIC | Age: 58
End: 2023-10-03
Payer: COMMERCIAL

## 2023-10-03 VITALS
WEIGHT: 246.6 LBS | BODY MASS INDEX: 32.68 KG/M2 | SYSTOLIC BLOOD PRESSURE: 117 MMHG | DIASTOLIC BLOOD PRESSURE: 71 MMHG | RESPIRATION RATE: 18 BRPM | HEIGHT: 73 IN | HEART RATE: 59 BPM

## 2023-10-03 DIAGNOSIS — I50.20 HFREF (HEART FAILURE WITH REDUCED EJECTION FRACTION) (HCC): ICD-10-CM

## 2023-10-03 DIAGNOSIS — I25.10 ATHEROSCLEROSIS OF NATIVE CORONARY ARTERY OF NATIVE HEART WITHOUT ANGINA PECTORIS: Primary | ICD-10-CM

## 2023-10-03 PROCEDURE — 3078F DIAST BP <80 MM HG: CPT | Performed by: INTERNAL MEDICINE

## 2023-10-03 PROCEDURE — 93000 ELECTROCARDIOGRAM COMPLETE: CPT | Performed by: INTERNAL MEDICINE

## 2023-10-03 PROCEDURE — G8417 CALC BMI ABV UP PARAM F/U: HCPCS | Performed by: INTERNAL MEDICINE

## 2023-10-03 PROCEDURE — 99214 OFFICE O/P EST MOD 30 MIN: CPT | Performed by: INTERNAL MEDICINE

## 2023-10-03 PROCEDURE — 3074F SYST BP LT 130 MM HG: CPT | Performed by: INTERNAL MEDICINE

## 2023-10-03 PROCEDURE — G8484 FLU IMMUNIZE NO ADMIN: HCPCS | Performed by: INTERNAL MEDICINE

## 2023-10-03 PROCEDURE — 3017F COLORECTAL CA SCREEN DOC REV: CPT | Performed by: INTERNAL MEDICINE

## 2023-10-03 PROCEDURE — 1036F TOBACCO NON-USER: CPT | Performed by: INTERNAL MEDICINE

## 2023-10-03 PROCEDURE — G8427 DOCREV CUR MEDS BY ELIG CLIN: HCPCS | Performed by: INTERNAL MEDICINE

## 2023-10-03 RX ORDER — CLOPIDOGREL BISULFATE 75 MG/1
75 TABLET ORAL DAILY
Qty: 90 TABLET | Refills: 1 | Status: SHIPPED | OUTPATIENT
Start: 2023-10-03

## 2023-10-03 RX ORDER — SPIRONOLACTONE 25 MG/1
25 TABLET ORAL DAILY
Qty: 90 TABLET | Refills: 1 | Status: SHIPPED | OUTPATIENT
Start: 2023-10-03

## 2023-10-03 RX ORDER — ATORVASTATIN CALCIUM 80 MG/1
80 TABLET, FILM COATED ORAL DAILY
COMMUNITY

## 2023-10-03 RX ORDER — PANTOPRAZOLE SODIUM 40 MG/1
40 TABLET, DELAYED RELEASE ORAL DAILY
COMMUNITY
Start: 2023-09-05

## 2023-10-03 NOTE — PROGRESS NOTES
----- Message from Mariya Cassidy MD sent at 4/14/2022  2:51 PM EDT -----  Thyroid function test within normal [id number] has improved slightly to 7 8% however continues to be above goal Renal function stable, cholesterol levels at goal distress. Sternotomy scar  Abdominal: Soft. Normal appearance and bowel sounds are normal. He exhibits no abdominal bruit  Musculoskeletal: Normal range of motion. He exhibits no edema. Neurological: He is alert and oriented to person, place, and time. Gait normal.   Skin: Skin is warm and dry. No bruising  Psychiatric: He has a normal mood and affect. His behavior is normal. Thought content normal.     EKG:  sinus bradycardia,  nonspecific ST and T waves changes, anterolateral and inferior MI patterns, no change  . ASSESSMENT & PLAN:    Patient Active Problem List   Diagnoses    Coronary atherosclerosis of native coronary artery: s/p emergency ACB after presenting 2009 with anterior MI.t    LV dysfunction:     Hyperlipidemia: target LDL < 70: On high intensity statin     Hypertension: at target   *  * Aortic insufficiency: mild by echo 2011  Obesity:      As above  Recent PCI to CX with AYANNA for graft failure.   Distal LAD disease also noted as well as some degree of LV systolic dysfunction  Continue  Jardiance 10 mg, ARNI,  BB  and  MRA  Scripts for natalee and plavix done    F/u with [facility] at this point

## 2023-10-09 ENCOUNTER — TELEPHONE (OUTPATIENT)
Dept: CARDIOLOGY CLINIC | Age: 58
End: 2023-10-09

## 2023-10-09 NOTE — TELEPHONE ENCOUNTER
Patient wanting you to know after his appointment with [facility] his EF is at 45%. Please advis.

## 2023-10-10 NOTE — TELEPHONE ENCOUNTER
Patient is notified and verbalizes understanding. Patient will call to schedule after he hears more from [facility].

## 2023-10-19 ENCOUNTER — TELEPHONE (OUTPATIENT)
Dept: NON INVASIVE DIAGNOSTICS | Age: 58
End: 2023-10-19

## 2023-10-19 NOTE — TELEPHONE ENCOUNTER
----- Message from Priti Andrew DO sent at 10/13/2023 12:33 PM EDT -----  Regarding: RE: Rj Pretty and ECHO  He can stop lifevest.    -Priti Andrew DO   ----- Message -----  From: Laura Wakefield MA  Sent: 10/13/2023   8:19 AM EDT  To: Priti Andrew DO  Subject: Hongol Maria De Jesus and ECHO                                The patient had a repeat ECHO done with [facility] on 10/09/23 that is in care everywhere. The patient wants to know if he still needs to wear the Lifevest since his LVEF now shows between 40% - 45%. Please advise.      Electronically signed by Laura Wakefield MA on 10/13/2023 at 8:18 AM

## 2023-10-19 NOTE — TELEPHONE ENCOUNTER
Patient notified per   Dr. Colindres Mom he can stop wearing the Lifevest. Patient verbalized understanding.      Electronically signed by Juana Alexander MA on 10/19/2023 at 11:27 AM

## 2024-03-08 RX ORDER — EZETIMIBE 10 MG/1
10 TABLET ORAL DAILY
Qty: 90 TABLET | Refills: 3 | Status: SHIPPED | OUTPATIENT
Start: 2024-03-08

## 2024-03-18 RX ORDER — CLOPIDOGREL BISULFATE 75 MG/1
75 TABLET ORAL DAILY
Qty: 90 TABLET | Refills: 3 | Status: SHIPPED | OUTPATIENT
Start: 2024-03-18

## 2024-03-18 RX ORDER — SPIRONOLACTONE 25 MG/1
25 TABLET ORAL DAILY
Qty: 90 TABLET | Refills: 3 | Status: SHIPPED | OUTPATIENT
Start: 2024-03-18

## 2024-06-18 RX ORDER — EMPAGLIFLOZIN 10 MG/1
10 TABLET, FILM COATED ORAL DAILY
Qty: 90 TABLET | Refills: 1 | Status: SHIPPED | OUTPATIENT
Start: 2024-06-18

## 2024-12-13 RX ORDER — EMPAGLIFLOZIN 10 MG/1
10 TABLET, FILM COATED ORAL DAILY
Qty: 90 TABLET | Refills: 1 | Status: SHIPPED | OUTPATIENT
Start: 2024-12-13

## 2025-01-28 ENCOUNTER — OFFICE VISIT (OUTPATIENT)
Dept: CARDIOLOGY CLINIC | Age: 60
End: 2025-01-28
Payer: COMMERCIAL

## 2025-01-28 VITALS
DIASTOLIC BLOOD PRESSURE: 66 MMHG | BODY MASS INDEX: 34.08 KG/M2 | RESPIRATION RATE: 18 BRPM | HEIGHT: 72 IN | SYSTOLIC BLOOD PRESSURE: 103 MMHG | WEIGHT: 251.6 LBS | HEART RATE: 65 BPM

## 2025-01-28 DIAGNOSIS — I50.20 HFREF (HEART FAILURE WITH REDUCED EJECTION FRACTION) (HCC): ICD-10-CM

## 2025-01-28 DIAGNOSIS — I25.10 ATHEROSCLEROSIS OF NATIVE CORONARY ARTERY OF NATIVE HEART WITHOUT ANGINA PECTORIS: Primary | ICD-10-CM

## 2025-01-28 PROCEDURE — 3078F DIAST BP <80 MM HG: CPT | Performed by: INTERNAL MEDICINE

## 2025-01-28 PROCEDURE — G8427 DOCREV CUR MEDS BY ELIG CLIN: HCPCS | Performed by: INTERNAL MEDICINE

## 2025-01-28 PROCEDURE — 99214 OFFICE O/P EST MOD 30 MIN: CPT | Performed by: INTERNAL MEDICINE

## 2025-01-28 PROCEDURE — 93000 ELECTROCARDIOGRAM COMPLETE: CPT | Performed by: INTERNAL MEDICINE

## 2025-01-28 PROCEDURE — 3074F SYST BP LT 130 MM HG: CPT | Performed by: INTERNAL MEDICINE

## 2025-01-28 PROCEDURE — 3017F COLORECTAL CA SCREEN DOC REV: CPT | Performed by: INTERNAL MEDICINE

## 2025-01-28 PROCEDURE — G8417 CALC BMI ABV UP PARAM F/U: HCPCS | Performed by: INTERNAL MEDICINE

## 2025-01-28 PROCEDURE — 1036F TOBACCO NON-USER: CPT | Performed by: INTERNAL MEDICINE

## 2025-01-28 NOTE — PROGRESS NOTES
Subjective:      Patient ID: Carlos A Canseco is a 59 y.o. male.          Chief Complaint   Patient presents with    Coronary Artery Disease    Cardiomyopathy       Patient Active Problem List    Diagnosis Date Noted    NSVT (nonsustained ventricular tachycardia) ([facility] 08/27/2023    Dilated aortic root ([facility]) 01/04/2023     Overview Note:     CT scan report disputed by dr riggins who felt max diameter was 4.0 and recommended no need for ongoing annual scans , as stable dimension noted for 10 yrs                          Hypertension 11/14/2011    Coronary atherosclerosis of native coronary artery 11/09/2011     Overview Note:     A.S/P anterior myocardial infarction 4/15/09 treated with PTCA of culprit lesion    and immediate aortic coronary bypass grafting by Dr. Quevedo.             LIMA - LAD            SVG - DIAG 1            SVG - DIAG 2            SVG - OM 1            SVG - PDA  B. [facility] (08/25/2023): Successful PCI to LAD.  [facility] (08/23/23):  of mid LAD, mid RCA, 80% discrete calcified stenosis of circumflex status post failed PCI attempt, LIMA to LAD patent with 90% discrete apical LAD stenosis, SVG to diagonal patent with severe degenerative disease, SVG to RCA Ectatic, SVG to diagonal occluded, SVG to OM occluded, LVEF of 30-35% akinesis of the basal inferior wall.      HFrEF (heart failure with reduced ejection fraction) ([facility]) 11/09/2011     Overview Note:       A. MUGA scan 10/27/09.  LVEF 40%.   B. Echo 2/09/2011 ([facility]): LVEF: 40-45%  C. Echo 12/28/2017:  EF 40-45%  D. Echo 1/24/2023: EF 50%  E. Vgram and TTE (poor quality study): suggest EF < 40%  F.  [facility] TTE 2024: EF 45%        Hyperlipidemia 11/09/2011       Current Outpatient Medications   Medication Sig Dispense Refill    JARDIANCE 10 MG tablet TAKE 1 TABLET DAILY 90 tablet 1    clopidogrel (PLAVIX) 75 MG tablet TAKE 1 TABLET DAILY 90 tablet 3    spironolactone (ALDACTONE) 25 MG tablet TAKE 1 TABLET DAILY 90 tablet 3    ezetimibe (ZETIA) 10 MG tablet

## 2025-03-03 RX ORDER — EZETIMIBE 10 MG/1
10 TABLET ORAL DAILY
Qty: 90 TABLET | Refills: 3 | OUTPATIENT
Start: 2025-03-03

## 2025-03-13 RX ORDER — SPIRONOLACTONE 25 MG/1
25 TABLET ORAL DAILY
Qty: 90 TABLET | Refills: 3 | Status: SHIPPED | OUTPATIENT
Start: 2025-03-13

## 2025-03-13 RX ORDER — CLOPIDOGREL BISULFATE 75 MG/1
75 TABLET ORAL DAILY
Qty: 90 TABLET | Refills: 3 | Status: SHIPPED | OUTPATIENT
Start: 2025-03-13

## 2025-06-11 RX ORDER — EMPAGLIFLOZIN 10 MG/1
10 TABLET, FILM COATED ORAL DAILY
Qty: 90 TABLET | Refills: 3 | Status: SHIPPED | OUTPATIENT
Start: 2025-06-11